# Patient Record
Sex: FEMALE | Race: WHITE | HISPANIC OR LATINO | Employment: OTHER | URBAN - METROPOLITAN AREA
[De-identification: names, ages, dates, MRNs, and addresses within clinical notes are randomized per-mention and may not be internally consistent; named-entity substitution may affect disease eponyms.]

---

## 2017-09-29 ENCOUNTER — OFFICE VISIT (OUTPATIENT)
Dept: INTERNAL MEDICINE | Facility: MEDICAL CENTER | Age: 62
End: 2017-09-29
Payer: MEDICARE

## 2017-09-29 VITALS
HEART RATE: 114 BPM | WEIGHT: 195 LBS | HEIGHT: 63 IN | TEMPERATURE: 97.1 F | OXYGEN SATURATION: 95 % | DIASTOLIC BLOOD PRESSURE: 80 MMHG | BODY MASS INDEX: 34.55 KG/M2 | SYSTOLIC BLOOD PRESSURE: 130 MMHG

## 2017-09-29 DIAGNOSIS — Z23 ENCOUNTER FOR IMMUNIZATION: ICD-10-CM

## 2017-09-29 DIAGNOSIS — M32.8 OTHER FORMS OF SYSTEMIC LUPUS ERYTHEMATOSUS (HCC): ICD-10-CM

## 2017-09-29 DIAGNOSIS — F11.20 NARCOTIC DEPENDENCE (HCC): ICD-10-CM

## 2017-09-29 DIAGNOSIS — Z79.899 LONG-TERM USE OF PLAQUENIL: ICD-10-CM

## 2017-09-29 DIAGNOSIS — Z00.00 HEALTHCARE MAINTENANCE: ICD-10-CM

## 2017-09-29 DIAGNOSIS — M79.7 FIBROMYALGIA: ICD-10-CM

## 2017-09-29 DIAGNOSIS — E11.42 TYPE 2 DIABETES MELLITUS WITH DIABETIC POLYNEUROPATHY, WITH LONG-TERM CURRENT USE OF INSULIN (HCC): ICD-10-CM

## 2017-09-29 DIAGNOSIS — Z79.4 TYPE 2 DIABETES MELLITUS WITH DIABETIC POLYNEUROPATHY, WITH LONG-TERM CURRENT USE OF INSULIN (HCC): ICD-10-CM

## 2017-09-29 PROCEDURE — 99204 OFFICE O/P NEW MOD 45 MIN: CPT | Mod: GC,25 | Performed by: INTERNAL MEDICINE

## 2017-09-29 PROCEDURE — G0008 ADMIN INFLUENZA VIRUS VAC: HCPCS | Performed by: INTERNAL MEDICINE

## 2017-09-29 PROCEDURE — 90686 IIV4 VACC NO PRSV 0.5 ML IM: CPT | Performed by: INTERNAL MEDICINE

## 2017-09-29 RX ORDER — IBUPROFEN 600 MG/1
TABLET ORAL
COMMUNITY
Start: 2017-01-05 | End: 2018-03-23

## 2017-09-29 RX ORDER — ZOLPIDEM TARTRATE 5 MG/1
TABLET ORAL
COMMUNITY
Start: 2017-09-18 | End: 2017-09-29

## 2017-09-29 RX ORDER — UBIDECARENONE 100 MG
CAPSULE ORAL
COMMUNITY
Start: 2017-02-06 | End: 2017-09-29

## 2017-09-29 RX ORDER — LISINOPRIL 10 MG/1
TABLET ORAL
COMMUNITY
Start: 2017-01-23 | End: 2017-10-30 | Stop reason: CLARIF

## 2017-09-29 RX ORDER — BLOOD-GLUCOSE METER
KIT MISCELLANEOUS
COMMUNITY
Start: 2016-10-21 | End: 2018-04-16

## 2017-09-29 RX ORDER — HYDROCODONE BITARTRATE AND ACETAMINOPHEN 10; 325 MG/1; MG/1
TABLET ORAL
COMMUNITY
Start: 2017-09-18 | End: 2017-09-29 | Stop reason: SDUPTHER

## 2017-09-29 RX ORDER — ATORVASTATIN CALCIUM 20 MG/1
TABLET, FILM COATED ORAL
COMMUNITY
Start: 2017-07-24 | End: 2017-10-30 | Stop reason: SDUPTHER

## 2017-09-29 RX ORDER — HYDROCODONE BITARTRATE AND ACETAMINOPHEN 10; 325 MG/1; MG/1
1 TABLET ORAL EVERY 8 HOURS PRN
Qty: 42 TAB | Refills: 0 | Status: SHIPPED | OUTPATIENT
Start: 2017-09-29 | End: 2017-10-13

## 2017-09-29 RX ORDER — ERGOCALCIFEROL 1.25 MG/1
CAPSULE ORAL
COMMUNITY
Start: 2016-04-03 | End: 2017-09-29

## 2017-09-29 RX ORDER — HYDROCODONE BITARTRATE AND ACETAMINOPHEN 10; 325 MG/1; MG/1
TABLET ORAL
Qty: 20 TAB | Status: CANCELLED | OUTPATIENT
Start: 2017-09-29

## 2017-09-29 RX ORDER — SERTRALINE HYDROCHLORIDE 100 MG/1
TABLET, FILM COATED ORAL
COMMUNITY
Start: 2017-04-08 | End: 2017-12-04 | Stop reason: SDUPTHER

## 2017-09-29 RX ORDER — NORTRIPTYLINE HYDROCHLORIDE 25 MG/1
CAPSULE ORAL
COMMUNITY
Start: 2017-04-06 | End: 2017-09-29 | Stop reason: SDUPTHER

## 2017-09-29 RX ORDER — NORTRIPTYLINE HYDROCHLORIDE 25 MG/1
25 CAPSULE ORAL 3 TIMES DAILY
Qty: 30 CAP | Refills: 3 | Status: SHIPPED | OUTPATIENT
Start: 2017-09-29 | End: 2017-10-25 | Stop reason: SDUPTHER

## 2017-09-29 RX ORDER — HYDROXYCHLOROQUINE SULFATE 200 MG/1
TABLET, FILM COATED ORAL
COMMUNITY
Start: 2017-07-24 | End: 2018-07-06 | Stop reason: SDUPTHER

## 2017-09-29 RX ORDER — HYDROXYZINE HYDROCHLORIDE 25 MG/1
TABLET, FILM COATED ORAL
COMMUNITY
Start: 2016-11-19 | End: 2017-09-29

## 2017-09-29 RX ORDER — PILOCARPINE HYDROCHLORIDE 5 MG/1
TABLET, FILM COATED ORAL
COMMUNITY
Start: 2017-01-25 | End: 2018-03-23 | Stop reason: SDUPTHER

## 2017-09-29 RX ORDER — GABAPENTIN 300 MG/1
CAPSULE ORAL
COMMUNITY
Start: 2016-05-28 | End: 2017-12-04 | Stop reason: SDUPTHER

## 2017-09-29 RX ORDER — METRONIDAZOLE 10 MG/G
GEL TOPICAL
COMMUNITY
Start: 2016-10-20 | End: 2018-04-28

## 2017-09-29 ASSESSMENT — PAIN SCALES - GENERAL: PAINLEVEL: 9=SEVERE PAIN

## 2017-09-29 NOTE — PROGRESS NOTES
"Marisa Zayas is a 61 y.o. female here for a non-provider visit for:   FLU    Reason for immunization: Annual Flu Vaccine  Immunization records indicate need for vaccine: Yes, confirmed with Epic  Minimum interval has been met for this vaccine: Yes  ABN completed: Not Indicated    Order and dose verified by: KG  VIS Dated  8/7/15 was given to patient: Yes  All IAC Questionnaire questions were answered \"No.\"    Patient tolerated injection and no adverse effects were observed or reported: Yes    Pt scheduled for next dose in series: Not Indicated    "

## 2017-09-29 NOTE — PATIENT INSTRUCTIONS
QUIT smoking and use nicotine patches   We need medical records from [facility]   Get blood work done and come back in 2 weeks to go over your results   Referral to pain management, optometry (eye doctor), rheumatology

## 2017-09-29 NOTE — LETTER
[facility]  Aidan Castellanos M.D.  [address]  [place] [address]  Fax: [phone]   Authorization for Release/Disclosure of   Protected Health Information   Name: ERMELINDA MANN : 1955 SSN: [id number]   Address: [address]  [place] [address] Phone:    [phone] (home)    I authorize the entity listed below to release/disclose the PHI below to:   [facility]/Aidan Castellanos M.D. and Aidan Castellanos M.D.   Provider or Entity Name:     Address   City, State, Zip   Phone:      Fax:     Reason for request: continuity of care   Information to be released:    [  ] LAST COLONOSCOPY,  including any PATH REPORT and follow-up  [  ] LAST FIT/COLOGUARD RESULT [  ] LAST DEXA  [  ] LAST MAMMOGRAM  [  ] LAST PAP  [  ] LAST LABS [  ] RETINA EXAM REPORT  [  ] IMMUNIZATION RECORDS  [  ] Release all info      [  ] Check here and initial the line next to each item to release ALL health information INCLUDING  _____ Care and treatment for drug and / or alcohol abuse  _____ HIV testing, infection status, or AIDS  _____ Genetic Testing    DATES OF SERVICE OR TIME PERIOD TO BE DISCLOSED: _____________  I understand and acknowledge that:  * This Authorization may be revoked at any time by you in writing, except if your health information has already been used or disclosed.  * Your health information that will be used or disclosed as a result of you signing this authorization could be re-disclosed by the recipient. If this occurs, your re-disclosed health information may no longer be protected by State or Federal laws.  * You may refuse to sign this Authorization. Your refusal will not affect your ability to obtain treatment.  * This Authorization becomes effective upon signing and will  on (date) __________.      If no date is indicated, this Authorization will  one (1) year from the signature date.    Name: Ermelinda Mann    Signature:   Date:     2017       PLEASE FAX REQUESTED RECORDS  BACK TO: [phone]

## 2017-09-30 ASSESSMENT — ENCOUNTER SYMPTOMS
BLOOD IN STOOL: 0
SHORTNESS OF BREATH: 0
SORE THROAT: 0
DIARRHEA: 0
ORTHOPNEA: 0
DIZZINESS: 0
TINGLING: 0
HEMOPTYSIS: 0
WHEEZING: 0
CHILLS: 0
PHOTOPHOBIA: 0
LOSS OF CONSCIOUSNESS: 0
INSOMNIA: 0
PALPITATIONS: 0
FOCAL WEAKNESS: 0
BACK PAIN: 1
MYALGIAS: 1
DIAPHORESIS: 0
SENSORY CHANGE: 0
WEAKNESS: 0
HALLUCINATIONS: 0
HEADACHES: 0
VOMITING: 0
NERVOUS/ANXIOUS: 0
ABDOMINAL PAIN: 0
BLURRED VISION: 0
CONSTIPATION: 0
WEIGHT LOSS: 0
TREMORS: 0
DEPRESSION: 0
SEIZURES: 0
NAUSEA: 0
FEVER: 0
EYE PAIN: 0
HEARTBURN: 0

## 2017-09-30 ASSESSMENT — LIFESTYLE VARIABLES: SUBSTANCE_ABUSE: 0

## 2017-09-30 NOTE — PROGRESS NOTES
New Patient to Establish    Reason to establish: New patient to establish    CC: establish care     HPI:     Marisa Zayas is a friendly 62 yo White woman with PMHx of SLE (on Plaquenil), rheumatoid arthritis, fibromyalgia, type II DM with neuropathy, HTN and tobacco abuse ( 1-2 ppd since she was a teenager) who presents to clinic today to establish care.     On Plaquenil for several years due to lupus. She was diagnosed several years ago (cannot remember exactly when) after a malar rash, multiple episodes of arthralgias and photosensitivity. She had been under the care of a rheumatologist previously. Her joint pain is so severe that she has limited mobility. She is no longer independent and has moved to [place] for this reason. She now has her sister live with her who helps her bath, commute and do other daily activities. She is disabled. Uses cane and walker at home; is mostly wheelchair dependent to commute when outside such as at doctor's office or grocery shopping. Her sister accompanied her today. Uses narcotic prescription drugs for chronic pain due to arthritis; recently, she cut down her Norco  mg to three times daily from QID previously. Also uses nortriptyline, gabapentin and sertraline for pain management along with NSAIDs.     She was hospitalized in 2016 at [place] in [place] due to complicated pneumonia and was intubated in the ICU for several days. Underwent thoracentesis at that time. Does not recall other such episodes of serositis.     Currently still smokes ~1 ppd, although as a teenager she used to smoke 2 packs per day. >30 pack year hx in this current smoker. Denies alcohol or illicit drug abuse.     There are no active problems to display for this patient.      Past Medical History:   Diagnosis Date   • Fibromyalgia 2001   • Lupus 2000   • Depression    • Diabetes    • Hypertension    • Lupus (systemic lupus erythematosus) (CMS-HCC)    • SVT (supraventricular tachycardia) (CMS-[facility])   "      Current Outpatient Prescriptions   Medication Sig Dispense Refill   • pilocarpine (SALAGEN) 5 MG Tab Take 1 tablet by mouth 3 times a day     • sertraline (ZOLOFT) 100 MG Tab Take  by mouth.     • atorvastatin (LIPITOR) 20 MG Tab Take 1 tablet by mouth daily     • lisinopril (PRINIVIL) 10 MG Tab Take  by mouth.     • hydroxychloroquine (PLAQUENIL) 200 MG Tab Take 1 tablet by mouth daily     • ibuprofen (MOTRIN) 600 MG Tab Take  by mouth.     • insulin NPH (HUMULIN N) 100 UNIT/ML Suspension by Subdermal route.     • Insulin Syringe-Needle U-100 (BD INSULIN SYRINGE ULTRAFINE) 31G X 15/64\" 0.3 ML Misc Use for injection as directed     • ONETOUCH DELICA LANCETS FINE Misc by Does not apply route.     • Blood Glucose Monitoring Suppl (ONETOUCH VERIO IQ SYSTEM) w/Device Kit by Does not apply route.     • glucose blood (ONETOUCH VERIO) strip by Does not apply route.     • Cholecalciferol (D 2000) 2000 UNIT Tab Take  by mouth.     • gabapentin (NEURONTIN) 300 MG Cap Take  by mouth.     • metronidazole (METROGEL) 1 % gel Apply to affected area(s) 2 times a day     • Coenzyme Q10 (COQ-10) 100 MG Cap Take  by mouth.     • ipratropium-albuterol (COMBIVENT RESPIMAT)  MCG/ACT Aero Soln Inhale 1 Puff by mouth 4 times a day.     • nortriptyline (PAMELOR) 25 MG Cap Take 1 Cap by mouth 3 times a day. 30 Cap 3   • hydrocodone/acetaminophen (NORCO)  MG Tab Take 1 Tab by mouth every 8 hours as needed for Severe Pain for up to 14 days. 42 Tab 0   • PREDNISONE by Does not apply route.     • GLIPIZIDE by Does not apply route.     • MetFORMIN HCl (GLUCOPHAGE PO) Take  by mouth.     • PARoxetine HCl (PAXIL PO) Take  by mouth.     • LISINOPRIL PO Take  by mouth.     • ATENOLOL PO Take  by mouth.       No current facility-administered medications for this visit.        Allergies as of 09/29/2017   • (No Known Allergies)       Social History     Social History   • Marital status: Single     Spouse name: N/A   • Number of " "children: N/A   • Years of education: N/A     Occupational History   • Not on file.     Social History Main Topics   • Smoking status: Current Every Day Smoker     Packs/day: 1.00   • Smokeless tobacco: Never Used      Comment: 1 ppd since teenager   • Alcohol use No   • Drug use: No   • Sexual activity: Not on file     Other Topics Concern   • Not on file     Social History Narrative   • No narrative on file       Family History   Problem Relation Age of Onset   • Heart Disease Mother 72     Valve repair   • Heart Disease Sister 50     Cardiac arrest, Valve repair   • Genetic Daughter 30     multiple sclerosis       Past Surgical History:   Procedure Laterality Date   • OTHER ORTHOPEDIC SURGERY  2016    back   • APPENDECTOMY     • CHOLECYSTECTOMY     • LAMINOTOMY     • TONSILLECTOMY         ROS: As per HPI. Additional pertinent symptoms as noted below.    Review of Systems   Constitutional: Positive for malaise/fatigue. Negative for chills, diaphoresis, fever and weight loss.   HENT: Negative for sore throat.    Eyes: Negative for blurred vision, photophobia and pain.   Respiratory: Negative for hemoptysis, shortness of breath and wheezing.    Cardiovascular: Negative for chest pain, palpitations, orthopnea and leg swelling.   Gastrointestinal: Negative for abdominal pain, blood in stool, constipation, diarrhea, heartburn, nausea and vomiting.   Musculoskeletal: Positive for back pain, joint pain and myalgias.   Skin: Negative for rash.   Neurological: Negative for dizziness, tingling, tremors, sensory change, focal weakness, seizures, loss of consciousness, weakness and headaches.   Psychiatric/Behavioral: Negative for depression, hallucinations, substance abuse and suicidal ideas. The patient is not nervous/anxious and does not have insomnia.          /80   Pulse (!) 114   Temp 36.2 °C (97.1 °F)   Ht 1.6 m (5' 3\")   Wt 88.5 kg (195 lb)   SpO2 95%   BMI 34.54 kg/m²     Physical Exam  General:  Middle " aged woman appearing older than stated age, with central obesity and round facies, in NAD, conversant and pleasant    Eyes: Pupils equal and reactive. EOMI. No scleral icterus. No nystagmus     Throat: Clear no erythema or exudates noted. No thrush.       Neck: Supple. No lymphadenopathy noted. Thyroid not enlarged.    Lungs: Clear to auscultation bilaterally. No tachypnea or use of accessory mm of respiration.     Cardiovascular: Regular rate and rhythm. Normal S1, S2. No murmurs, rubs or gallops. No heaves or lifts noted.     Abdomen:  Benign. No rebound or guarding noted. Normoactive bowel sounds. No hepatosplenomegaly. Obese pannus with striae.     Extremities: No clubbing, cyanosis, edema.    Diabetic foot exam:   Physical Exam   Cardiovascular:   Pulses:       Dorsalis pedis pulses are 2+ on the right side, and 2+ on the left side.   Musculoskeletal:        Right foot: There is normal range of motion and no deformity.        Left foot: There is normal range of motion and no deformity.   Feet:   Right Foot:   Protective Sensation: 10 sites tested. 10 sites sensed.   Skin Integrity: Positive for callus and dry skin. Negative for ulcer, blister, skin breakdown, erythema or warmth.   Left Foot:   Protective Sensation: 10 sites tested. 10 sites sensed.   Skin Integrity: Positive for dry skin. Negative for ulcer, blister, skin breakdown, erythema, warmth or callus.     Skin: Clear. No rash or suspicious skin lesions noted.    Neuro: A&O x 4. CN II-XII intact. No resting or intention tremor. No Rhomberg sign. Gait abnormal with impaired right lower extremity abduction with ambulation.     Note: I have reviewed all pertinent labs and diagnostic tests associated with this visit with specific comments listed under the assessment and plan below    Assessment and Plan    1. Other forms of systemic lupus erythematosus (CMS-HCC)  - obtain prior records     - REFERRAL TO RHEUMATOLOGY  - REFERRAL TO OPTOMETRY  - HEMOGLOBIN  A1C; Future  - CBC WITH DIFFERENTIAL; Future  - COMP METABOLIC PANEL; Future  - LIPID PROFILE; Future  - MICROALBUMIN CREAT RATIO URINE; Future  - REFERRAL TO PAIN CLINIC  - nortriptyline (PAMELOR) 25 MG Cap; Take 1 Cap by mouth 3 times a day.  Dispense: 30 Cap; Refill: 3  - hydrocodone/acetaminophen (NORCO)  MG Tab; Take 1 Tab by mouth every 8 hours as needed for Severe Pain for up to 14 days.  Dispense: 42 Tab; Refill: 0    2. Long-term use of Plaquenil  3. Chronic immunosuppression   - urgent referral sent to rheumatology   - will need regular eye exams due to Plaquenil use     - REFERRAL TO OPTOMETRY  - HEMOGLOBIN A1C; Future  - CBC WITH DIFFERENTIAL; Future  - COMP METABOLIC PANEL; Future  - LIPID PROFILE; Future  - MICROALBUMIN CREAT RATIO URINE; Future  - REFERRAL TO PAIN CLINIC    4. Narcotic dependence (CMS-HCC)  - signed pain contract today and gave her a prescription refill for Norco  mg q8H prn severe pain for 14 days   - pt to come back in 14 days for check up and lab review; meanwhile, she will be seen by our internal pain management (either Tyler Yadav or Mar[facility]) until she can formally be seen by pain clinic   - urine drug testing today   - REFERRAL TO PAIN CLINIC  - Controlled Substance Treatment Agreement  - [facility] PAIN MANAGEMENT SCREEN; Future  - hydrocodone/acetaminophen (NORCO)  MG Tab; Take 1 Tab by mouth every 8 hours as needed for Severe Pain for up to 14 days.  Dispense: 42 Tab; Refill: 0    4. Healthcare maintenance    Preventive care  Flu - today 9/29/17   TD- utd reportedly, need prior records   TDaP - utd -- need prior records   Pneumococcal - reportedly given in [place] when pt was hospitalized, will obtain records   Prevnar - f/u prior records   Colonoscopy - done in [place] in 2016   Zostavax- counseled, pt can obtain at any out-pt pharmacy   HCV - needs to be tested    Women only  Pap - obtain records to see when it was last done; if not, will obtain at  next visit   Mammogram - done in 2016, will obtain records   Dexa - done 3 years ago     - HEMOGLOBIN A1C; Future  - CBC WITH DIFFERENTIAL; Future  - COMP METABOLIC PANEL; Future  - LIPID PROFILE; Future  - MICROALBUMIN CREAT RATIO URINE; Future  - REFERRAL TO PAIN CLINIC  - HEP C VIRUS ANTIBODY    5. Type 2 diabetes mellitus with diabetic polyneuropathy, with long-term current use of insulin (CMS-[facility])  - fasting labs   - continue current insulin regimen and f/u in 2 weeks   - keep log of daily blood sugars as well as diet   - HEMOGLOBIN A1C; Future  - COMP METABOLIC PANEL; Future  - MICROALBUMIN CREAT RATIO URINE; Future  - nortriptyline (PAMELOR) 25 MG Cap; Take 1 Cap by mouth 3 times a day.  Dispense: 30 Cap; Refill: 3    6. Fibromyalgia  - nortriptyline (PAMELOR) 25 MG Cap; Take 1 Cap by mouth 3 times a day.  Dispense: 30 Cap; Refill: 3  - hydrocodone/acetaminophen (NORCO)  MG Tab; Take 1 Tab by mouth every 8 hours as needed for Severe Pain for up to 14 days.  Dispense: 42 Tab; Refill: 0    7. Encounter for immunization  - Consent for Influenza Vaccine  - Flu Quad Inj >3 Year Pre-Filled PF      8. Tobacco abuse   - discussed at length need for smoking cessation; smoking causes inflammation which will worsen her Lupus and arthritis flares   - pt to use nicotine patches that she had obtained before discharge from her last hospitalization   - will check again at next visit   - will need referral for low dose CT scan for lung cancer screening at f/u visit in 5 weeks       9. Long term use of NSAIDs   - DMII and SLE can lead to nephropathy; discussed that she MUST limit her NSAID usage to prevent renal impairment, pt voiced understanding -- will discuss again at f/u visit       Followup: Return in about 2 weeks (around 10/13/2017) for Long.    Pt to f/u in 2 weeks with labs. Then, she must f/u again within 3 weeks (to be seen specifically by me in early November)    Risk Assessment (discuss potential  complications a function of chronic problems):     chronically immunosuppressed due to Plaquenil use along with DMII; retinopathy is a complication of chronic Plaquenil usage;   DMII and SLE can lead to nephropathy; discussed that she MUST limit her NSAID usage to prevent renal impairment, pt voiced understanding -- will discuss again at f/u visit   narcotic dependence; tobacco abuse     Complexity (discuss number of co-morbidities): multiple comorbid conditions including     Signed by: Aidan Castellanos M.D.

## 2017-10-13 ENCOUNTER — APPOINTMENT (OUTPATIENT)
Dept: INTERNAL MEDICINE | Facility: MEDICAL CENTER | Age: 62
End: 2017-10-13
Payer: MEDICARE

## 2017-10-14 ENCOUNTER — HOSPITAL ENCOUNTER (OUTPATIENT)
Dept: LAB | Facility: MEDICAL CENTER | Age: 62
End: 2017-10-14
Attending: HOSPITALIST
Payer: MEDICARE

## 2017-10-14 DIAGNOSIS — Z79.899 LONG-TERM USE OF PLAQUENIL: ICD-10-CM

## 2017-10-14 DIAGNOSIS — M32.8 OTHER FORMS OF SYSTEMIC LUPUS ERYTHEMATOSUS (HCC): ICD-10-CM

## 2017-10-14 DIAGNOSIS — Z00.00 HEALTHCARE MAINTENANCE: ICD-10-CM

## 2017-10-14 DIAGNOSIS — Z79.4 TYPE 2 DIABETES MELLITUS WITH DIABETIC POLYNEUROPATHY, WITH LONG-TERM CURRENT USE OF INSULIN (HCC): ICD-10-CM

## 2017-10-14 DIAGNOSIS — E11.42 TYPE 2 DIABETES MELLITUS WITH DIABETIC POLYNEUROPATHY, WITH LONG-TERM CURRENT USE OF INSULIN (HCC): ICD-10-CM

## 2017-10-14 LAB
ALBUMIN SERPL BCP-MCNC: 4.8 G/DL (ref 3.2–4.9)
ALBUMIN/GLOB SERPL: 1.8 G/DL
ALP SERPL-CCNC: 93 U/L (ref 30–99)
ALT SERPL-CCNC: 53 U/L (ref 2–50)
ANION GAP SERPL CALC-SCNC: 9 MMOL/L (ref 0–11.9)
AST SERPL-CCNC: 45 U/L (ref 12–45)
BASOPHILS # BLD AUTO: 1.1 % (ref 0–1.8)
BASOPHILS # BLD: 0.08 K/UL (ref 0–0.12)
BILIRUB SERPL-MCNC: 0.4 MG/DL (ref 0.1–1.5)
BUN SERPL-MCNC: 16 MG/DL (ref 8–22)
CALCIUM SERPL-MCNC: 9.6 MG/DL (ref 8.5–10.5)
CHLORIDE SERPL-SCNC: 103 MMOL/L (ref 96–112)
CHOLEST SERPL-MCNC: 251 MG/DL (ref 100–199)
CO2 SERPL-SCNC: 24 MMOL/L (ref 20–33)
CREAT SERPL-MCNC: 0.65 MG/DL (ref 0.5–1.4)
CREAT UR-MCNC: 200.3 MG/DL
EOSINOPHIL # BLD AUTO: 0.24 K/UL (ref 0–0.51)
EOSINOPHIL NFR BLD: 3.2 % (ref 0–6.9)
ERYTHROCYTE [DISTWIDTH] IN BLOOD BY AUTOMATED COUNT: 43.8 FL (ref 35.9–50)
EST. AVERAGE GLUCOSE BLD GHB EST-MCNC: 189 MG/DL
GFR SERPL CREATININE-BSD FRML MDRD: >60 ML/MIN/1.73 M 2
GLOBULIN SER CALC-MCNC: 2.7 G/DL (ref 1.9–3.5)
GLUCOSE SERPL-MCNC: 154 MG/DL (ref 65–99)
HBA1C MFR BLD: 8.2 % (ref 0–5.6)
HCT VFR BLD AUTO: 45.2 % (ref 37–47)
HDLC SERPL-MCNC: 63 MG/DL
HGB BLD-MCNC: 15.7 G/DL (ref 12–16)
IMM GRANULOCYTES # BLD AUTO: 0.03 K/UL (ref 0–0.11)
IMM GRANULOCYTES NFR BLD AUTO: 0.4 % (ref 0–0.9)
LDLC SERPL CALC-MCNC: 149 MG/DL
LYMPHOCYTES # BLD AUTO: 2.3 K/UL (ref 1–4.8)
LYMPHOCYTES NFR BLD: 30.7 % (ref 22–41)
MCH RBC QN AUTO: 33.8 PG (ref 27–33)
MCHC RBC AUTO-ENTMCNC: 34.7 G/DL (ref 33.6–35)
MCV RBC AUTO: 97.4 FL (ref 81.4–97.8)
MICROALBUMIN UR-MCNC: 5.6 MG/DL
MICROALBUMIN/CREAT UR: 28 MG/G (ref 0–30)
MONOCYTES # BLD AUTO: 0.32 K/UL (ref 0–0.85)
MONOCYTES NFR BLD AUTO: 4.3 % (ref 0–13.4)
NEUTROPHILS # BLD AUTO: 4.52 K/UL (ref 2–7.15)
NEUTROPHILS NFR BLD: 60.3 % (ref 44–72)
NRBC # BLD AUTO: 0 K/UL
NRBC BLD AUTO-RTO: 0 /100 WBC
PLATELET # BLD AUTO: 115 K/UL (ref 164–446)
PMV BLD AUTO: 11.6 FL (ref 9–12.9)
POTASSIUM SERPL-SCNC: 4.2 MMOL/L (ref 3.6–5.5)
PROT SERPL-MCNC: 7.5 G/DL (ref 6–8.2)
RBC # BLD AUTO: 4.64 M/UL (ref 4.2–5.4)
SODIUM SERPL-SCNC: 136 MMOL/L (ref 135–145)
TRIGL SERPL-MCNC: 195 MG/DL (ref 0–149)
WBC # BLD AUTO: 7.5 K/UL (ref 4.8–10.8)

## 2017-10-14 PROCEDURE — 83036 HEMOGLOBIN GLYCOSYLATED A1C: CPT

## 2017-10-14 PROCEDURE — 85025 COMPLETE CBC W/AUTO DIFF WBC: CPT

## 2017-10-14 PROCEDURE — 80053 COMPREHEN METABOLIC PANEL: CPT

## 2017-10-14 PROCEDURE — 82043 UR ALBUMIN QUANTITATIVE: CPT

## 2017-10-14 PROCEDURE — 36415 COLL VENOUS BLD VENIPUNCTURE: CPT

## 2017-10-14 PROCEDURE — 80061 LIPID PANEL: CPT

## 2017-10-14 PROCEDURE — 82570 ASSAY OF URINE CREATININE: CPT

## 2017-10-25 DIAGNOSIS — Z79.4 TYPE 2 DIABETES MELLITUS WITH DIABETIC POLYNEUROPATHY, WITH LONG-TERM CURRENT USE OF INSULIN (HCC): ICD-10-CM

## 2017-10-25 DIAGNOSIS — E11.42 TYPE 2 DIABETES MELLITUS WITH DIABETIC POLYNEUROPATHY, WITH LONG-TERM CURRENT USE OF INSULIN (HCC): ICD-10-CM

## 2017-10-25 DIAGNOSIS — M79.7 FIBROMYALGIA: ICD-10-CM

## 2017-10-26 RX ORDER — NORTRIPTYLINE HYDROCHLORIDE 25 MG/1
25 CAPSULE ORAL 3 TIMES DAILY
Qty: 30 CAP | Refills: 2 | Status: SHIPPED | OUTPATIENT
Start: 2017-10-26 | End: 2019-10-24

## 2017-10-26 NOTE — TELEPHONE ENCOUNTER
Patient Seen: 09/29/17 Next Appointment: 10/30/17  Was the patient seen in the last year in this department? Yes     Does patient have an active prescription for medications requested? No     Received Request Via: Patient

## 2017-10-30 ENCOUNTER — OFFICE VISIT (OUTPATIENT)
Dept: INTERNAL MEDICINE | Facility: MEDICAL CENTER | Age: 62
End: 2017-10-30
Payer: MEDICARE

## 2017-10-30 ENCOUNTER — TELEPHONE (OUTPATIENT)
Dept: INTERNAL MEDICINE | Facility: MEDICAL CENTER | Age: 62
End: 2017-10-30

## 2017-10-30 VITALS
TEMPERATURE: 97.7 F | BODY MASS INDEX: 35.26 KG/M2 | RESPIRATION RATE: 16 BRPM | HEART RATE: 81 BPM | SYSTOLIC BLOOD PRESSURE: 126 MMHG | HEIGHT: 63 IN | DIASTOLIC BLOOD PRESSURE: 66 MMHG | OXYGEN SATURATION: 93 % | WEIGHT: 199 LBS

## 2017-10-30 DIAGNOSIS — G89.28 OTHER CHRONIC POSTPROCEDURAL PAIN: ICD-10-CM

## 2017-10-30 DIAGNOSIS — E11.42 TYPE 2 DIABETES MELLITUS WITH DIABETIC POLYNEUROPATHY, WITH LONG-TERM CURRENT USE OF INSULIN (HCC): ICD-10-CM

## 2017-10-30 DIAGNOSIS — Z79.4 TYPE 2 DIABETES MELLITUS WITH DIABETIC POLYNEUROPATHY, WITH LONG-TERM CURRENT USE OF INSULIN (HCC): ICD-10-CM

## 2017-10-30 DIAGNOSIS — I10 ESSENTIAL HYPERTENSION: ICD-10-CM

## 2017-10-30 DIAGNOSIS — M35.9 UNDIFFERENTIATED CONNECTIVE TISSUE DISEASE (HCC): ICD-10-CM

## 2017-10-30 DIAGNOSIS — E66.9 OBESITY (BMI 30-39.9): ICD-10-CM

## 2017-10-30 PROCEDURE — 99214 OFFICE O/P EST MOD 30 MIN: CPT | Mod: GC | Performed by: INTERNAL MEDICINE

## 2017-10-30 RX ORDER — LISINOPRIL 10 MG/1
10 TABLET ORAL DAILY
Qty: 30 TAB | Refills: 3 | Status: SHIPPED | OUTPATIENT
Start: 2017-10-30 | End: 2018-07-06 | Stop reason: SDUPTHER

## 2017-10-30 RX ORDER — ATORVASTATIN CALCIUM 20 MG/1
40 TABLET, FILM COATED ORAL DAILY
Qty: 30 TAB | Refills: 3 | Status: SHIPPED | OUTPATIENT
Start: 2017-10-30 | End: 2018-07-06

## 2017-10-30 RX ORDER — HYDROCODONE BITARTRATE AND ACETAMINOPHEN 10; 325 MG/1; MG/1
1-2 TABLET ORAL EVERY 8 HOURS PRN
Qty: 63 TAB | Refills: 0 | Status: SHIPPED | OUTPATIENT
Start: 2017-10-30 | End: 2017-11-20

## 2017-10-30 RX ORDER — METFORMIN HYDROCHLORIDE 500 MG/1
1000 TABLET, EXTENDED RELEASE ORAL DAILY
Qty: 60 TAB | Refills: 3 | Status: SHIPPED | OUTPATIENT
Start: 2017-10-30 | End: 2017-12-04 | Stop reason: SDUPTHER

## 2017-10-30 ASSESSMENT — PATIENT HEALTH QUESTIONNAIRE - PHQ9: CLINICAL INTERPRETATION OF PHQ2 SCORE: 0

## 2017-10-30 ASSESSMENT — PAIN SCALES - GENERAL
PAINLEVEL: 10=SEVERE PAIN
PAINLEVEL: 9=SEVERE PAIN

## 2017-10-30 NOTE — PATIENT INSTRUCTIONS
Start taking metformin (1 pill daily for the first week, then take 2 daily next week onwards). Better to take with food.   Call Rheumatology and Eye doctor ASAP.   Cut DOWN ibuprofen to 600 mg up to maximum three times daily.   Start taking Norco  three times daily until seen by pain clinic.   Take atorvastatin 40 mg daily (instead of 20 mg).   You are up to date on vaccinations.   Next time, we will obtain pap smear when you are not in pain.

## 2017-10-30 NOTE — TELEPHONE ENCOUNTER
1. Caller Name: Marisa Zayas                                         Call Back Number: [phone] (home)     Called patient to let her know that her Rx NORCO is ready for  at the front.

## 2017-10-31 NOTE — PROGRESS NOTES
Established Patient    Marisa presents today with the following:    CC: back pain and knee pain, 5 week f/u     HPI:   Marisa Zayas is a friendly 60 yo White woman with PMHx of undifferentiated connective tissue disease (on Plaquenil),, fibromyalgia, type II DM with neuropathy, HTN and tobacco abuse ( 1-2 ppd since she was a teenager) who presents to clinic today for 5 week f/u. She missed her last f/u at 3 weeks after initial visit.     Current back and bilateral knee pain is severe 9/10. She has not been taking any narcotic pain medications in the last one week. She ran out of her 2 week supply of narcotics. This was given to her until she can establish care with [facility] pain clinic, but her appointment with pain is in mid November. Her UDS was reviewed and was consistent with narcotic treatment. She has been taking 6 pills daily of 600 mg ibuprofen (around the clock) to alleviate her joint pain. Denies any black or bloody stools or abdominal pain. She understands that such high dose NSAID usage can result in kidney damage but she is in severe pain after being out of her narcotics. No aberrant behavior or substance use.     Undifferentiated connective tissue disease:   Reported that she was dx with SLE but records from Rheumatology obtained and showed this diagnosis. On plaquenil. Joint pain worse after stopping narcotics. Still taking gabapentin and nortriptyline. Avoiding sun exposure which causes photosensitive rash. Has not made an appointment with Rheum or optometry due to insurance issues previously, but will do so now.     Healthcare maintenance:   Reports she was tested for HCV by her prior PCP and was negative. Needs mammogram referral in 2017-18 and pap smear at next visit. She is in pain today and won't be able to be up in [facility].     Tobacco abuse:   Continues to smoke although trying to cut down.  Understands inflammation from smoking can worsen connective tissue disease.       Patient Active  "Problem List    Diagnosis Date Noted   • Obesity (BMI 30-39.9) 10/30/2017   • Undifferentiated connective tissue disease (CMS-HCC) 10/30/2017   • Essential hypertension 10/30/2017   • Type 2 diabetes mellitus with diabetic polyneuropathy, with long-term current use of insulin (CMS-HCC) 10/30/2017       Current Outpatient Prescriptions   Medication Sig Dispense Refill   • metformin ER (GLUCOPHAGE XR) 500 MG TABLET SR 24 HR Take 2 Tabs by mouth every day. 60 Tab 3   • glucose monitoring kit (FREESTYLE) monitoring kit 1 Each by Does not apply route Once for 1 dose. 1 Kit 0   • lisinopril (PRINIVIL) 10 MG Tab Take 1 Tab by mouth every day. 30 Tab 3   • atorvastatin (LIPITOR) 20 MG Tab Take 2 Tabs by mouth every day. TAKE 2 TABLETS BY MOUTH DAILY 30 Tab 3   • hydrocodone/acetaminophen (NORCO)  MG Tab Take 1-2 Tabs by mouth every 8 hours as needed for Severe Pain for up to 21 days. 63 Tab 0   • nortriptyline (PAMELOR) 25 MG Cap Take 1 Cap by mouth 3 times a day. 30 Cap 2   • pilocarpine (SALAGEN) 5 MG Tab Take 1 tablet by mouth 3 times a day     • sertraline (ZOLOFT) 100 MG Tab Take  by mouth.     • hydroxychloroquine (PLAQUENIL) 200 MG Tab Take 1 tablet by mouth daily     • ibuprofen (MOTRIN) 600 MG Tab Take  by mouth.     • insulin NPH (HUMULIN N) 100 UNIT/ML Suspension by Subdermal route.     • Insulin Syringe-Needle U-100 (BD INSULIN SYRINGE ULTRAFINE) 31G X 15/64\" 0.3 ML Misc Use for injection as directed     • ONETOUCH DELICA LANCETS FINE Misc by Does not apply route.     • Blood Glucose Monitoring Suppl (ONETOUCH VERIO IQ SYSTEM) w/Device Kit by Does not apply route.     • glucose blood (ONETOUCH VERIO) strip by Does not apply route.     • Cholecalciferol (D 2000) 2000 UNIT Tab Take  by mouth.     • gabapentin (NEURONTIN) 300 MG Cap Take  by mouth.     • metronidazole (METROGEL) 1 % gel Apply to affected area(s) 2 times a day     • Coenzyme Q10 (COQ-10) 100 MG Cap Take  by mouth.     • ipratropium-albuterol " "(COMBIVENT RESPIMAT)  MCG/ACT Aero Soln Inhale 1 Puff by mouth 4 times a day.     • PREDNISONE by Does not apply route.     • GLIPIZIDE by Does not apply route.     • PARoxetine HCl (PAXIL PO) Take  by mouth.     • ATENOLOL PO Take  by mouth.       No current facility-administered medications for this visit.        ROS: As per HPI. Additional pertinent symptoms as noted below.    Review of Systems   Constitutional: Negative for chills, diaphoresis, fever and weight loss.   HENT: Negative for sore throat.    Eyes: Negative for blurred vision, photophobia and pain.   Respiratory: Negative for hemoptysis, shortness of breath and wheezing.    Cardiovascular: Negative for chest pain, palpitations, orthopnea and leg swelling.   Gastrointestinal: Negative for abdominal pain, blood in stool, constipation, diarrhea, heartburn, nausea and vomiting.   Musculoskeletal: Positive for back pain (severe today since she did not take pain meds), joint pain and myalgias.   Skin: Negative for rash.   Neurological: Negative for dizziness, tingling, tremors, sensory change, focal weakness, seizures, loss of consciousness, weakness and headaches.   Psychiatric/Behavioral: Negative for depression, hallucinations, substance abuse and suicidal ideas. The patient is not nervous/anxious and does not have insomnia.      /66   Pulse 81   Temp 36.5 °C (97.7 °F)   Resp 16   Ht 1.6 m (5' 3\")   Wt 90.3 kg (199 lb)   SpO2 93%   BMI 35.25 kg/m²     Physical Exam    General:  Middle aged woman appearing older than stated age, with central obesity and round facies, in some distress    Eyes: Pupils equal and reactive. EOMI. No scleral icterus. No nystagmus     Throat: Clear no erythema or exudates noted. No thrush.        Neck: Supple. No lymphadenopathy noted. Thyroid not enlarged.     Lungs: Clear to auscultation bilaterally. No tachypnea or use of accessory mm of respiration.      Cardiovascular: Regular rate and rhythm. Normal S1, " S2. No murmurs, rubs or gallops. No heaves or lifts noted.      Abdomen:  Benign. No rebound or guarding noted. Normoactive bowel sounds. No hepatosplenomegaly. Obese pannus      Extremities: No clubbing, cyanosis, edema.    Skin: Clear. No rash or suspicious skin lesions noted.     Neuro: A&O x 4. CN II-XII intact. No resting or intention tremor.    Note: I have reviewed all pertinent labs and diagnostic tests associated with this visit with specific comments listed under the assessment and plan below      Assessment and Plan    Start metformin  mg   F/u with Rheum, optometry and [facility] pain clinic   Pap - next visit (pt in pain today, not able to get up in [facility])   Mammogram - will need referral at next visit   Referral for low dose CT scan for lung cancer screening   Repeat blood work in 1-2 mo for HbA1C      1. Type 2 diabetes mellitus with diabetic polyneuropathy, with long-term current use of insulin (CMS-HCC)  - metformin ER (GLUCOPHAGE XR) 500 MG TABLET SR 24 HR; Take 2 Tabs by mouth every day.  Dispense: 60 Tab; Refill: 3   - repeat HbA1C at next visit and discuss stopping insulin; she was on SSI due to her preference expressed to prior PCP; she did not want to take more oral medications including metformin and glipizide, but is willing to try metformin again now     - glucose monitoring kit (FREESTYLE) monitoring kit; 1 Each by Does not apply route Once for 1 dose.  Dispense: 1 Kit; Refill: 0  - lisinopril (PRINIVIL) 10 MG Tab; Take 1 Tab by mouth every day.  Dispense: 30 Tab; Refill: 3  - atorvastatin (LIPITOR) 20 MG Tab; Take 2 Tabs by mouth every day. TAKE 2 TABLETS BY MOUTH DAILY  Dispense: 30 Tab; Refill: 3  - REFERRAL TO NUTRITION SERVICES    2. Essential hypertension  - lisinopril (PRINIVIL) 10 MG Tab; Take 1 Tab by mouth every day.  Dispense: 30 Tab; Refill: 3    3. Fibromyalgia  - continue gabapentin, nortriptyline   - [place] until seen by [facility] pain (appointment in mid Nov)    -  hydrocodone/acetaminophen (NORCO)  MG Tab; Take 1-2 Tabs by mouth every 8 hours as needed for Severe Pain for up to 21 days.  Dispense: 63 Tab; Refill: 0      4. Undifferentiated connective tissue disease (CMS-HCC)  5. Long-term use of Plaquenil  6. Chronic immunosuppression   - referral to rheumatology already sent; pt now with insurance so she will make appointment   - will need regular eye exams due to Plaquenil use -- referral sent to optometry, pt to make appointment   - hydrocodone/acetaminophen (NORCO)  MG Tab; Take 1-2 Tabs by mouth every 8 hours as needed for Severe Pain for up to 21 days.  Dispense: 63 Tab; Refill: 0   - she has an appointment with [facility] pain in mid November; pain contract already signed, UDS reviewed       7. Obesity (BMI 30-39.9)  - metformin ER (GLUCOPHAGE XR) 500 MG TABLET SR 24 HR; Take 2 Tabs by mouth every day.  Dispense: 60 Tab; Refill: 3      8. Tobacco abuse   - discussed need for smoking cessation; smoking causes inflammation which will worsen her Lupus and arthritis flares   - pt to use nicotine patches that she had obtained before discharge from her last hospitalization   - will need referral for low dose CT scan for lung cancer screening at f/u visit       9. Long term use of NSAIDs   - pt agreed to cut down ibuprofen 600 mg q4h to ibuprofen 600 mg q8h   discussed that she MUST limit her NSAID usage to prevent renal impairment, pt voiced understanding       Preventive care  Flu - 9/2017  TD- 2011  TDaP - 2011  PPSV23 given in 2011 at   Colonoscopy - utd, declines repeat colonoscopy   Zostavax- 2016  HCV - reports she was tested by her prior PCP, negative     Women only  Pap - next visit (pt in pain today, not able to get up in [facility])   Mammogram - will need referral at next visit   Dexa - n/a       Followup: Return in about 5 weeks (around 12/4/2017) for Short.      Signed by: Aidan Castellanos M.D.

## 2017-11-30 ENCOUNTER — HOSPITAL ENCOUNTER (OUTPATIENT)
Dept: RADIOLOGY | Facility: MEDICAL CENTER | Age: 62
End: 2017-11-30
Attending: PAIN MEDICINE
Payer: MEDICARE

## 2017-11-30 DIAGNOSIS — M47.814 THORACIC SPONDYLOSIS WITHOUT MYELOPATHY: ICD-10-CM

## 2017-11-30 PROCEDURE — 72157 MRI CHEST SPINE W/O & W/DYE: CPT

## 2017-11-30 PROCEDURE — 72158 MRI LUMBAR SPINE W/O & W/DYE: CPT

## 2017-11-30 PROCEDURE — 700117 HCHG RX CONTRAST REV CODE 255: Performed by: PAIN MEDICINE

## 2017-11-30 PROCEDURE — A9579 GAD-BASE MR CONTRAST NOS,1ML: HCPCS | Performed by: PAIN MEDICINE

## 2017-11-30 RX ADMIN — GADODIAMIDE 20 ML: 287 INJECTION INTRAVENOUS at 15:50

## 2017-12-04 ENCOUNTER — OFFICE VISIT (OUTPATIENT)
Dept: INTERNAL MEDICINE | Facility: MEDICAL CENTER | Age: 62
End: 2017-12-04
Payer: MEDICARE

## 2017-12-04 DIAGNOSIS — M79.7 FIBROMYALGIA: ICD-10-CM

## 2017-12-04 DIAGNOSIS — E11.42 TYPE 2 DIABETES MELLITUS WITH DIABETIC POLYNEUROPATHY, WITH LONG-TERM CURRENT USE OF INSULIN (HCC): ICD-10-CM

## 2017-12-04 DIAGNOSIS — F33.41 RECURRENT MAJOR DEPRESSIVE DISORDER, IN PARTIAL REMISSION (HCC): ICD-10-CM

## 2017-12-04 DIAGNOSIS — Z12.39 SCREENING FOR BREAST CANCER: ICD-10-CM

## 2017-12-04 DIAGNOSIS — Z00.00 HEALTHCARE MAINTENANCE: ICD-10-CM

## 2017-12-04 DIAGNOSIS — Z79.4 TYPE 2 DIABETES MELLITUS WITH DIABETIC POLYNEUROPATHY, WITH LONG-TERM CURRENT USE OF INSULIN (HCC): ICD-10-CM

## 2017-12-04 PROCEDURE — 99214 OFFICE O/P EST MOD 30 MIN: CPT | Mod: GC | Performed by: INTERNAL MEDICINE

## 2017-12-04 RX ORDER — SERTRALINE HYDROCHLORIDE 100 MG/1
100 TABLET, FILM COATED ORAL DAILY
Qty: 30 TAB | Refills: 6 | Status: SHIPPED | OUTPATIENT
Start: 2017-12-04 | End: 2018-07-06 | Stop reason: SDUPTHER

## 2017-12-04 RX ORDER — METFORMIN HYDROCHLORIDE 500 MG/1
1000 TABLET, EXTENDED RELEASE ORAL DAILY
Qty: 60 TAB | Refills: 3 | Status: SHIPPED | OUTPATIENT
Start: 2017-12-04 | End: 2018-07-06 | Stop reason: SDUPTHER

## 2017-12-04 RX ORDER — GABAPENTIN 300 MG/1
300 CAPSULE ORAL 3 TIMES DAILY
Qty: 90 CAP | Refills: 6 | Status: SHIPPED | OUTPATIENT
Start: 2017-12-04 | End: 2018-04-28

## 2017-12-04 NOTE — PATIENT INSTRUCTIONS
Quit SMOKING   F/u with blood work in mid January   Referral for mammogram. Please see your eye doctor as soon as possible.   Please call [place] to send your rheumatology records to rheumatology clinic.   Call me with your glucometer brand name and insulin. I will refill it directly.

## 2017-12-05 NOTE — PROGRESS NOTES
"      Established Patient    Marisa presents today with the following:    CC: 5 week f/u     HPI:     Marisa Zayas is a friendly 60 yo White woman with PMHx of undifferentiated connective tissue disease (on Plaquenil),, fibromyalgia, type II DM with neuropathy, HTN and extensive tobacco abuse who presents to clinic today for 5 week f/u. She has not yet seen the rheumatology referral or had an eye exam done since her last visit.     Undifferentiated connective tissue disease:   On plaquenil. Joint pain worsening now. Was seen at pain clinic here who prescribed her narcotics. Still taking gabapentin and nortriptyline. Avoiding sun exposure which causes photosensitive rash. Has not made an appointment with Rheum or optometry due to insurance issues previously; referral sent to rheum but has had trouble seeing them. Discussed decreasing ibuprofen.      Healthcare maintenance:   Reports she was tested for HCV by her prior PCP and was negative. Needs mammogram referral in 2017-18 and pap smear at next visit. Declines pap smear again today since she is having \"the runs\" (GI upset).      Tobacco abuse:   Continues to smoke but cut it down since she can't afford it.  Understands inflammation from smoking can worsen connective tissue disease.     Patient Active Problem List    Diagnosis Date Noted   • Obesity (BMI 30-39.9) 10/30/2017   • Undifferentiated connective tissue disease (CMS-[facility]) 10/30/2017   • Essential hypertension 10/30/2017   • Type 2 diabetes mellitus with diabetic polyneuropathy, with long-term current use of insulin (CMS-HCC) 10/30/2017       Current Outpatient Prescriptions   Medication Sig Dispense Refill   • gabapentin (NEURONTIN) 300 MG Cap Take 1 Cap by mouth 3 times a day. 90 Cap 6   • sertraline (ZOLOFT) 100 MG Tab Take 1 Tab by mouth every day. 30 Tab 6   • metformin ER (GLUCOPHAGE XR) 500 MG TABLET SR 24 HR Take 2 Tabs by mouth every day. 60 Tab 3   • lisinopril (PRINIVIL) 10 MG Tab Take 1 Tab by " "mouth every day. 30 Tab 3   • atorvastatin (LIPITOR) 20 MG Tab Take 2 Tabs by mouth every day. TAKE 2 TABLETS BY MOUTH DAILY 30 Tab 3   • nortriptyline (PAMELOR) 25 MG Cap Take 1 Cap by mouth 3 times a day. 30 Cap 2   • pilocarpine (SALAGEN) 5 MG Tab Take 1 tablet by mouth 3 times a day     • hydroxychloroquine (PLAQUENIL) 200 MG Tab Take 1 tablet by mouth daily     • ibuprofen (MOTRIN) 600 MG Tab Take  by mouth.     • insulin NPH (HUMULIN N) 100 UNIT/ML Suspension by Subdermal route.     • Insulin Syringe-Needle U-100 (BD INSULIN SYRINGE ULTRAFINE) 31G X 15/64\" 0.3 ML Misc Use for injection as directed     • ONETOUCH DELICA LANCETS FINE Misc by Does not apply route.     • Blood Glucose Monitoring Suppl (ONETOUCH VERIO IQ SYSTEM) w/Device Kit by Does not apply route.     • glucose blood (ONETOUCH VERIO) strip by Does not apply route.     • Cholecalciferol (D 2000) 2000 UNIT Tab Take  by mouth.     • metronidazole (METROGEL) 1 % gel Apply to affected area(s) 2 times a day     • Coenzyme Q10 (COQ-10) 100 MG Cap Take  by mouth.     • ipratropium-albuterol (COMBIVENT RESPIMAT)  MCG/ACT Aero Soln Inhale 1 Puff by mouth 4 times a day.     • GLIPIZIDE by Does not apply route.       No current facility-administered medications for this visit.        ROS: As per HPI. Additional pertinent symptoms as noted below.  Review of Systems   Constitutional: Negative for chills, fever and malaise/fatigue.   HENT: Negative for hearing loss.    Eyes: Negative for photophobia and pain.   Cardiovascular: Negative for chest pain and orthopnea.   Gastrointestinal: Positive for diarrhea ( loose BM, no watery diarrhea). Negative for abdominal pain, nausea and vomiting.   Genitourinary: Negative for flank pain.   Musculoskeletal: Positive for back pain, joint pain and myalgias. Negative for falls and neck pain.   Skin: Negative for itching and rash.   Neurological: Negative for sensory change.   Psychiatric/Behavioral: Positive for " "depression. Negative for hallucinations, memory loss, substance abuse and suicidal ideas. The patient is nervous/anxious. The patient does not have insomnia.          /68   Pulse 95   Temp 36.6 °C (97.8 °F)   Ht 1.6 m (5' 3\")   Wt 90.3 kg (199 lb)   SpO2 96%   BMI 35.25 kg/m²     General:  Middle aged woman appearing older than stated age, with central obesity and round facies, in moderate distress  Eyes: Pupils equal and reactive. EOMI. No scleral icterus. No nystagmus   Lungs: Clear to auscultation bilaterally. No tachypnea or use of accessory mm of respiration.   Cardiovascular: Regular rate and rhythm. Normal S1, S2. No murmurs, rubs or gallops. No heaves or lifts noted.   Abdomen:  Benign. No rebound or guarding noted. Normoactive bowel sounds. No hepatosplenomegaly. Obese pannus   Extremities: No clubbing, cyanosis, edema.  Skin: Clear. No rash or suspicious skin lesions noted.  Neuro: A&O x 4. CN II-XII intact. No resting or intention tremor.  Psych: tearful, distressed. No audiovisual hallucinations. No suicidal ideation.      Note: I have reviewed all pertinent labs and diagnostic tests associated with this visit with specific comments listed under the assessment and plan below    Note: I have reviewed all pertinent labs and diagnostic tests associated with this visit with specific comments listed under the assessment and plan below    Assessment and Plan    F/u with Rheum, optometry and [facility] pain clinic   Pap - next visit (declined again today)    Mammogram referral sent   Obtain mammogram, eye exam, low dose CT before next visit   Repeat HbA1C ordered for Jimmy 15. We will call with results and schedule appropriate f/u     1. Undifferentiated connective tissue disease   - she will need to see rheumatology ASAP due to worsening joint pain   - continue plaquenil and discussed need to see optometry for an eye exam   - counseled on decreasing ibuprofen but pt states her pain is worse without it; " discussed risks of CKD and gastrointestinal bleeding    - it is imperative that she sees Rheum soon     2. Type 2 diabetes mellitus with diabetic polyneuropathy, with long-term current use of insulin (CMS-HCC)  - reports taking insulin NPH differently from her prior prescription with [facility]:    - takes 15 u subcutaneous q AM and 12 u q HS   - her glucose strips that were ordered do not fit her glucometer; she does not remember what glucometer she has; will call clinic with her glucometer name so that we can order refill of appropriate testing strips   - gabapentin (NEURONTIN) 300 MG Cap; Take 1 Cap by mouth 3 times a day.  Dispense: 90 Cap; Refill: 6  - metformin ER (GLUCOPHAGE XR) 500 MG TABLET SR 24 HR; Take 2 Tabs by mouth every day.  Dispense: 60 Tab; Refill: 3  - HEMOGLOBIN A1C; Future    2. Fibromyalgia  - continue gabapentin, nortriptyline   - sertraline (ZOLOFT) 100 MG Tab; Take 1 Tab by mouth every day.  Dispense: 30 Tab; Refill: 6    4. Healthcare maintenance   5. Screening for breast cancer  - pt declines CT lung ca screening since she cannot afford it right now     - MA-SCREEN MAMMO W/CAD-BILAT; Standing  - MA-SCREEN MAMMO W/CAD-BILAT      6. Recurrent major depressive disorder, in partial remission (CMS-HCC)  - no SI, HI or thoughts of self harm   - sertraline (ZOLOFT) 100 MG Tab; Take 1 Tab by mouth every day.  Dispense: 30 Tab; Refill: 6       Preventive care  Flu - 9/2017  TD- 2011  TDaP - 2011  PPSV23 given in 2011 at   Colonoscopy - utd, declines repeat colonoscopy   Zostavax- 2016  HCV - reports she was tested by her prior PCP, negative         Women only  Pap - declined today   Mammogram - referral sent   Dexa - will discuss      Followup: Return in about 3 months (around 3/4/2018).  Multiple comorbid conditions. Level of complexity -- level 5     Signed by: Aidan Castellanos M.D.

## 2017-12-06 VITALS
SYSTOLIC BLOOD PRESSURE: 124 MMHG | BODY MASS INDEX: 35.26 KG/M2 | DIASTOLIC BLOOD PRESSURE: 68 MMHG | OXYGEN SATURATION: 96 % | HEART RATE: 95 BPM | TEMPERATURE: 97.8 F | WEIGHT: 199 LBS | HEIGHT: 63 IN

## 2017-12-06 ASSESSMENT — ENCOUNTER SYMPTOMS
MYALGIAS: 1
NECK PAIN: 0
CHILLS: 0
MEMORY LOSS: 0
HALLUCINATIONS: 0
NAUSEA: 0
DIARRHEA: 1
NERVOUS/ANXIOUS: 1
ORTHOPNEA: 0
SENSORY CHANGE: 0
ABDOMINAL PAIN: 0
BACK PAIN: 1
DEPRESSION: 1
VOMITING: 0
FALLS: 0
EYE PAIN: 0
INSOMNIA: 0
FEVER: 0
PHOTOPHOBIA: 0
FLANK PAIN: 0

## 2017-12-06 ASSESSMENT — LIFESTYLE VARIABLES: SUBSTANCE_ABUSE: 0

## 2018-03-23 ENCOUNTER — OFFICE VISIT (OUTPATIENT)
Dept: INTERNAL MEDICINE | Facility: MEDICAL CENTER | Age: 63
End: 2018-03-23
Payer: MEDICARE

## 2018-03-23 VITALS
SYSTOLIC BLOOD PRESSURE: 116 MMHG | OXYGEN SATURATION: 91 % | TEMPERATURE: 97.8 F | WEIGHT: 197 LBS | HEIGHT: 63 IN | DIASTOLIC BLOOD PRESSURE: 80 MMHG | BODY MASS INDEX: 34.91 KG/M2 | HEART RATE: 109 BPM

## 2018-03-23 DIAGNOSIS — R68.2 DRY MOUTH: ICD-10-CM

## 2018-03-23 DIAGNOSIS — G47.00 INSOMNIA, UNSPECIFIED TYPE: ICD-10-CM

## 2018-03-23 DIAGNOSIS — E11.42 TYPE 2 DIABETES MELLITUS WITH DIABETIC POLYNEUROPATHY, WITH LONG-TERM CURRENT USE OF INSULIN (HCC): ICD-10-CM

## 2018-03-23 DIAGNOSIS — Z79.4 TYPE 2 DIABETES MELLITUS WITH DIABETIC POLYNEUROPATHY, WITH LONG-TERM CURRENT USE OF INSULIN (HCC): ICD-10-CM

## 2018-03-23 PROCEDURE — 92250 FUNDUS PHOTOGRAPHY W/I&R: CPT | Mod: TC,GC | Performed by: INTERNAL MEDICINE

## 2018-03-23 PROCEDURE — 99214 OFFICE O/P EST MOD 30 MIN: CPT | Mod: GC | Performed by: INTERNAL MEDICINE

## 2018-03-23 RX ORDER — TIZANIDINE 2 MG/1
TABLET ORAL
COMMUNITY
Start: 2017-12-29 | End: 2018-04-16

## 2018-03-23 RX ORDER — ASPIRIN 81 MG/1
81 TABLET, CHEWABLE ORAL DAILY
Qty: 100 TAB | Refills: 6 | Status: SHIPPED | OUTPATIENT
Start: 2018-03-23 | End: 2018-07-06 | Stop reason: SDUPTHER

## 2018-03-23 RX ORDER — GABAPENTIN 600 MG/1
TABLET ORAL
COMMUNITY
Start: 2017-12-28 | End: 2018-03-23

## 2018-03-23 RX ORDER — PILOCARPINE HYDROCHLORIDE 5 MG/1
5 TABLET, FILM COATED ORAL 3 TIMES DAILY
Qty: 90 TAB | Refills: 5 | Status: SHIPPED | OUTPATIENT
Start: 2018-03-23 | End: 2018-07-06 | Stop reason: SDUPTHER

## 2018-03-23 RX ORDER — LIDOCAINE 50 MG/G
OINTMENT TOPICAL
COMMUNITY
Start: 2017-12-29 | End: 2018-04-28

## 2018-03-23 RX ORDER — ALBUTEROL SULFATE 2.5 MG/3ML
SOLUTION RESPIRATORY (INHALATION)
COMMUNITY
Start: 2016-04-07 | End: 2018-04-07

## 2018-03-23 RX ORDER — MELATONIN 3 MG
5 TABLET ORAL
Qty: 30 TAB | Refills: 6 | Status: SHIPPED | OUTPATIENT
Start: 2018-03-23 | End: 2018-08-10

## 2018-03-23 ASSESSMENT — PAIN SCALES - GENERAL: PAINLEVEL: 9=SEVERE PAIN

## 2018-03-23 NOTE — PATIENT INSTRUCTIONS
Pap smear at next visit in 5 weeks   Melatonin for sleep. Try it and let me know.   Stop taking insulin completely.   Make appointments with rheumatology and mammogram ASAP.

## 2018-03-24 NOTE — PROGRESS NOTES
Established Patient    Marisa presents today with the following:    CC: 3 mo f/u for type II DM     HPI: Marisa Zayas is a friendly 60 yo White woman with PMHx of undifferentiated connective tissue disease (on Plaquenil),, fibromyalgia, type II DM with neuropathy, HTN and extensive tobacco abuse who presents to clinic today for f/u. Last seen in clinic on 12/4/18. She has still not made an appointment with Rheumatology (referred already) or had a diabetes eye exam.     Type II DM with neuropathy   Has not been taking insulin or checking her blood glucose since her last visit 3 months ago. Denies hypoglycemic symptoms. Did not make an appointment to have an eye exam in the last one year.   Clinic HgA1c 6.9% today.     Undifferentiated connective tissue disease   Did not make an appointment with Rhuematology yet although referral was sent 6 months ago. From chart review, Rheum has tried several times to contact her but she has not called them back. She has been on hydroxychloroquine with refills until 2019. Last CBC in October shows thrombocytopenia with platelets 115. She is agreeable to f/u with them now after having insurance established one month ago. She was previously without insurance and was worried about the costs of seeing consults without insurance.     Tobacco abuse   Still continues to smoke but cut it down since she can't afford it.Now that she has insurance, she is agreeable to f/u with smoking cessation program (referral previously sent).  Understands inflammation from smoking can worsen connective tissue disease. Does not want to try medications to quit smoking at this time.        Obesity   Seen by pain management recently for chronic low back pain, evaluated for surgical implant of pain pump. She believes she can be more active and exercise after the surgery. Does not want a referral to nutrition and weight management currently as she cannot afford to pay for them (limited budget due to SSI).        Healthcare maintenance   Already referred for mammogram (Ms. Zayas needs to schedule it). Needs pap smear but refuses it at every visit. We discussed importance of preventative care and she is agreeable to a pap smear visit in 5 weeks.   Needs pneumonoccal vaccination given her Type II DM. Previously received PPSV 23 in [place], needs PCV 13 followed by repeat PPSV23.   Needs c-scope but refuses it today. She wants to finish seeing her other consultations and referrals first. Denies melena, bloody stools, changes in bowel movements or abdominal pain or new weight loss.     Patient Active Problem List    Diagnosis Date Noted   • Obesity (BMI 30-39.9) 10/30/2017   • Undifferentiated connective tissue disease (CMS-HCC) 10/30/2017   • Essential hypertension 10/30/2017   • Type 2 diabetes mellitus with diabetic polyneuropathy, with long-term current use of insulin (CMS-HCC) 10/30/2017   • Thrombocytopenia (CMS-HCC) 01/28/2016       Current Outpatient Prescriptions   Medication Sig Dispense Refill   • pilocarpine (SALAGEN) 5 MG Tab Take 1 Tab by mouth 3 times a day for 180 days. 90 Tab 5   • albuterol (PROVENTIL) 2.5mg/3ml Nebu Soln solution for nebulization by Nebulization route.     • Melatonin 3-10 MG Tab Take 5 mg by mouth every bedtime. 30 Tab 6   • aspirin (ASA) 81 MG Chew Tab chewable tablet Take 1 Tab by mouth every day. 100 Tab 6   • gabapentin (NEURONTIN) 300 MG Cap Take 1 Cap by mouth 3 times a day. (Patient taking differently: Take 600 mg by mouth 3 times a day.) 90 Cap 6   • sertraline (ZOLOFT) 100 MG Tab Take 1 Tab by mouth every day. 30 Tab 6   • metformin ER (GLUCOPHAGE XR) 500 MG TABLET SR 24 HR Take 2 Tabs by mouth every day. 60 Tab 3   • lisinopril (PRINIVIL) 10 MG Tab Take 1 Tab by mouth every day. 30 Tab 3   • atorvastatin (LIPITOR) 20 MG Tab Take 2 Tabs by mouth every day. TAKE 2 TABLETS BY MOUTH DAILY 30 Tab 3   • nortriptyline (PAMELOR) 25 MG Cap Take 1 Cap by mouth 3 times a day. 30 Cap  "2   • hydroxychloroquine (PLAQUENIL) 200 MG Tab Take 1 tablet by mouth daily     • ONETOUCH DELICA LANCETS FINE Misc by Does not apply route.     • Blood Glucose Monitoring Suppl (ONETOUCH VERIO IQ SYSTEM) w/Device Kit by Does not apply route.     • Cholecalciferol (D 2000) 2000 UNIT Tab Take  by mouth.     • Coenzyme Q10 (COQ-10) 100 MG Cap Take  by mouth.     • ipratropium-albuterol (COMBIVENT RESPIMAT)  MCG/ACT Aero Soln Inhale 1 Puff by mouth 4 times a day.     • lidocaine (XYLOCAINE) 5 % Ointment      • tizanidine (ZANAFLEX) 2 MG tablet      • Insulin Syringe-Needle U-100 (BD INSULIN SYRINGE ULTRAFINE) 31G X 15/64\" 0.3 ML Misc Use for injection as directed     • glucose blood (ONETOUCH VERIO) strip by Does not apply route.     • metronidazole (METROGEL) 1 % gel Apply to affected area(s) 2 times a day       No current facility-administered medications for this visit.        ROS: As per HPI. Additional pertinent symptoms as noted below.  Review of Systems   Constitutional: Negative for chills, fever, malaise/fatigue and weight loss.   HENT: Negative for congestion and sinus pain.    Eyes: Negative for double vision, photophobia and pain.   Respiratory: Negative for cough, shortness of breath and wheezing.    Cardiovascular: Negative for chest pain and palpitations.   Gastrointestinal: Negative for abdominal pain, blood in stool, heartburn, melena, nausea and vomiting.   Genitourinary: Negative for dysuria and hematuria.   Musculoskeletal: Positive for back pain and joint pain. Negative for falls, myalgias and neck pain.   Skin: Negative for itching and rash.   Neurological: Negative for dizziness, loss of consciousness and headaches.   Psychiatric/Behavioral: Negative for hallucinations. The patient has insomnia.          /80   Pulse (!) 109   Temp 36.6 °C (97.8 °F)   Ht 1.6 m (5' 3\")   Wt 89.4 kg (197 lb)   SpO2 91%   Breastfeeding? No   BMI 34.90 kg/m²     Physical Exam   General:  Middle " aged woman appearing older than stated age, with central obesity and round facies, in moderate distress  Eyes: Pupils equal and reactive. EOMI. No scleral icterus. No nystagmus   Lungs: Clear to auscultation bilaterally. No tachypnea or use of accessory mm of respiration.   Cardiovascular: Regular rate and rhythm. Normal S1, S2. No murmurs, rubs or gallops. No heaves or lifts noted.   Abdomen:  Benign. No rebound or guarding noted. Normoactive bowel sounds. No hepatosplenomegaly. Obese pannus   Extremities: No clubbing, cyanosis, edema.  Skin: Clear. No rash or suspicious skin lesions noted.  Neuro: A&O x 4. CN II-XII intact. No resting or intention tremor.  Psych: euthymic, cooperative. No audiovisual hallucinations. No suicidal ideation    Note: I have reviewed all pertinent labs and diagnostic tests associated with this visit with specific comments listed under the assessment and plan below    Assessment and Plan    1. Type 2 diabetes mellitus with diabetic polyneuropathy, with long-term current use of insulin (CMS-HCC)  - POCT A1C 6.9% in office today   - discontinue insulin. Continue metformin XR 1000 mg daily. Will f/u with repeat A1c in 3 months and may increase to 2000 mg daily if needed   - POCT Retinal Eye Exam in clinic today   - aspirin (ASA) 81 MG Chew Tab chewable tablet; Take 1 Tab by mouth every day.  Dispense: 100 Tab; Refill: 6    - for primary prevention given her type II DM.     2. Dry mouth  - due to undifferentiated connective tissue disease   - pilocarpine (SALAGEN) 5 MG Tab; Take 1 Tab by mouth 3 times a day for 180 days.  Dispense: 90 Tab; Refill: 5    3. Insomnia, unspecified type  - Melatonin 3-10 MG Tab; Take 5 mg by mouth every bedtime.  Dispense: 30 Tab; Refill: 6    4. Healthcare maintenance   5. Screening for breast cancer  - pt declines CT lung ca screening since she cannot afford it right now   - also declines c-scope currently; will discuss both at f/u visit   - referral already  sent for mammogram, she is to make an appointment    - MA-SCREEN MAMMO W/CAD-BILAT; Standing      Preventive care  Flu - 9/2017  TD- 2011  TDaP - 2011  PPSV23 given in 2011 at   Colonoscopy - utd, declines repeat colonoscopy   Zostavax- 2016  HCV - negative        Women only  Pap - appointment in 5 weeks   Mammogram - referral sent, she needs to schedule it    DEXA -- discuss at f/u visit     Followup: Return in about 5 weeks (around 4/27/2018) for Short.  Given PPSV 23 in  in [place]. Needs PCV 13 followed by PPSV 23 again.   Pap smear visit in 5 weeks   Discuss f/u of mammogram (already referred)     Signed by: Aidan Castellanos M.D.

## 2018-03-25 ASSESSMENT — ENCOUNTER SYMPTOMS
DOUBLE VISION: 0
BACK PAIN: 1
WEIGHT LOSS: 0
PHOTOPHOBIA: 0
DIZZINESS: 0
SHORTNESS OF BREATH: 0
LOSS OF CONSCIOUSNESS: 0
EYE PAIN: 0
HALLUCINATIONS: 0
CHILLS: 0
SINUS PAIN: 0
HEARTBURN: 0
FEVER: 0
NECK PAIN: 0
WHEEZING: 0
INSOMNIA: 1
HEADACHES: 0
COUGH: 0
MYALGIAS: 0
VOMITING: 0
NAUSEA: 0
FALLS: 0
ABDOMINAL PAIN: 0
BLOOD IN STOOL: 0
PALPITATIONS: 0

## 2018-03-27 LAB — RETINAL SCREEN: NEGATIVE

## 2018-04-03 ENCOUNTER — TELEPHONE (OUTPATIENT)
Dept: INTERNAL MEDICINE | Facility: MEDICAL CENTER | Age: 63
End: 2018-04-03

## 2018-04-03 NOTE — TELEPHONE ENCOUNTER
Per pt request on the day of the last office visit. She would like to see if you could please place a DME order for a POWER NEB COMPRESSOR  Model number: [id number] Serial number: [id number]  Category: C11B type   Mode of operation: Intermittent Manufactured for: [employer]. If you could please include all of the above in the DME order.  Thank you!

## 2018-04-06 ENCOUNTER — TELEPHONE (OUTPATIENT)
Dept: HOSPITALIST | Facility: MEDICAL CENTER | Age: 63
End: 2018-04-06

## 2018-04-06 DIAGNOSIS — J44.89 COPD (CHRONIC OBSTRUCTIVE PULMONARY DISEASE) WITH CHRONIC BRONCHITIS: ICD-10-CM

## 2018-04-09 ENCOUNTER — TELEPHONE (OUTPATIENT)
Dept: INTERNAL MEDICINE | Facility: MEDICAL CENTER | Age: 63
End: 2018-04-09

## 2018-04-09 NOTE — TELEPHONE ENCOUNTER
Pt called and left a vm stating that she has tried Melatonin and she states the medication is not working for her and would like an alternative. Please Advise.

## 2018-04-10 ENCOUNTER — TELEPHONE (OUTPATIENT)
Dept: HOSPITALIST | Facility: MEDICAL CENTER | Age: 63
End: 2018-04-10

## 2018-04-10 DIAGNOSIS — Z98.890 S/P TENDON REPAIR: ICD-10-CM

## 2018-04-13 ENCOUNTER — TELEPHONE (OUTPATIENT)
Dept: HOSPITALIST | Facility: MEDICAL CENTER | Age: 63
End: 2018-04-13

## 2018-04-13 NOTE — TELEPHONE ENCOUNTER
Aidan Castellanos M.D.   You 20 hours ago (6:01 PM)      Yes tried to reach her and left a message. Will call her again.

## 2018-04-14 NOTE — TELEPHONE ENCOUNTER
Called Ms Zayas twice to check up on her and discuss her insomnia, but unable to reach her. Previously recommended that she practice good sleep hygiene and try melatonin. Left voicemail for her to call me if needed. Will call her again.

## 2018-04-16 ENCOUNTER — APPOINTMENT (OUTPATIENT)
Dept: ADMISSIONS | Facility: MEDICAL CENTER | Age: 63
End: 2018-04-16
Attending: PAIN MEDICINE
Payer: MEDICARE

## 2018-04-16 RX ORDER — CYCLOBENZAPRINE HCL 10 MG
10 TABLET ORAL 2 TIMES DAILY PRN
COMMUNITY
End: 2018-07-06 | Stop reason: SDUPTHER

## 2018-04-16 NOTE — OR NURSING
Phone preadmit done. Pt states she has cpap at night but doesn't use because is missing a part and hasn't followed up on getting it. She will bring her inhaler the morning of procedure. She will take her gabapentin, nortriptyline, and zoloft the morning of procedure with small sip of water as per anesthesia protocol. She states she has had a history of low platelets in the past. She is aware of npo instructions pre-procedure.

## 2018-04-18 NOTE — OP REPORT
Surgeon:   Rosa Cat MD  Patient name:   Marisa Zayas  YOB: 1955  Date Seen:   04/19/2018    Assistants: None    Surgeon: Rosa Cat MD    Assistants: None    Preoperative diagnosis: Chronic Pain , failed back surgery syndrome  Post operative diagnosis: Chronic Pain , failed back surgery syndrome    Type of Sedation:  General Anesthesia with ETT  Procedure:  1. Insertion of a Permanent Midline Octrode Spinal Cord Stimulator Lead  2. Insertion of a Permanent Left Octrode Spinal Cord Stimulator Lead  3. Intraoperative Neurostimulation Trial  4. Creation of a  left Implantable Power Generator (IPG) Pocket  5. Implantation of the IPG    Method of Surgery:  After discussing risks, benefits, and alternatives to the procedure, the patient expressed understanding and wished to proceed.  An IV was started in the pre-op area and IV fluid administration was continued throughout the procedure.  The patient was brought into the fluoroscopy suite and placed in the prone position.  Procedural pause was conducted to verify: correct patient identity, procedure to be performed and as applicable, correct side and site, correct patient position, and availability of implants, special equipment or special instruments.  Intravenous sedation appropriate to the procedure was administered by the physician/nurse and is accurately reflected in the nurse's notes. Blood pressure, heart rate, pulse oximetry, and cardiac monitoring were monitored throughout the procedure. The patient's vital signs remained stable throughout the procedure.  EKG monitoring revealed normal sinus rhythm.  A dose of Ancef was administered intravenously prior to starting the procedure.     The skin was sterilely prepped and draped in the usual fashion using chlorhexidine times three in the region that the procedure was to be performed.      Insertion of a Permanent Midline Octrode Spinal Cord Stimulator Lead    The left T12 - L1 interlaminar  space was identified fluoroscopically.  The skin and subcutaneous tissues overlying the left L1 lamina were anesthetized with 1% Lidocaine without epinephrine using a 25G 1.5 inch needle and a 25G 3.5 inch spinal needle for deeper tissues.  Then the 25 gauge 3.5 inch spinal needle was used to inject 0.5% bupivacaine with epinephrine for further local anesthetic control and hemostasis.  A 10-blade scalpel was used to make a 5 cm midline incision overlying the L1 spinous process to the L4 spinous process. Dissection was carried out with bovie to expose the bilateral interlaminar ligaments.  After blunt dissection was completed, the incision was copiously irrigated with bacitracin solution.  A 14-gauge 4.5 inch Tuohy epidural needle was inserted through the incision to the left L1 lamina and was then “walked off” the superior aspect of this lamina into the T12-L1 epidural space.  The epidural space was localized using a loss of resistance technique and intermittent fluoroscopic    guidance.  An octrode spinal cord stimulator lead was then advanced up the midline of the posterior epidural space to the top of T8 vertebra.  The 14-gauge 4.5 inch Tuohy epidural needle was then removed using a push-pull technique under direct fluoroscopic visualization to make sure the lead did not move. The proximal portion of the lead was anchored to the right interlaminar ligament using standard technique with interrupted 2-0 ethiobond sutures and a torpedo anchor.    Insertion of a Permanent Left Octrode Spinal Cord Stimulator Lead    The left T12-L1 interlaminar space was identified fluoroscopically.  The skin and subcutaneous tissues overlying the left L1 lamina were anesthetized with 1% Lidocaine without epinephrine using a 25G 1.5 inch needle and a 25G 3.5 inch spinal needle for deeper tissues.  A 14-gauge 4.5 inch Tuohy epidural needle was inserted through the skin to the left L1 lamina and was then “walked off” the superior aspect  of this lamina into the T12-L1 epidural space.  The epidural space was localized using a loss of resistance technique and intermittent fluoroscopic guidance.  An octrode spinal cord stimulator lead was then advanced up the left side of the posterior epidural space to the top of T9 vertebra.  The 14-gauge 4.5 inch Tuohy epidural needle was then removed using a push-pull technique under direct fluoroscopic visualization to make sure the lead did not move and the end of the lead  was pulled back into the incision side through the skin. The proximal portion of the lead was anchored to the left interlaminar ligament using standard technique with interrupted 2-0 ethibond sutures and a torpedo anchor.     Intraoperative Neurostimulation Trial    At this point, the distal ends of the leads were inserted and locked into the trial stimulator connectors.  Impedance was checked and multiple electrode combinations were utilized to provide paresthesia coverage of the patient's area of pain.  No abdominal or rib/sternal stimulation was noted by the patient.      Creation of a left IPG Pocket    The skin and subcutaneous tissues overlying the skin, left later to midline incision was anesthetized with 1% Lidocaine without epinephrine using a 25G 1.5 inch needle. Dissection was made left lateral to midline incision with mireille, blunt technique and bovie. Hemostasis was achieved.  A generous subcutaneous pocket was created using blunt dissection that was approximately 2 cm deep to the skin's surface.  After it was completed, the pocket was copiously irrigated with bacitracin solution and packed with sterile 4x4's.    The left spinal cord stimulator lead was connected to the inferior connector of the IPG.  The right    spinal cord stimulator lead was connected to the superior connector of the IPG.  After connection to the IPG, the system was activated confirming that the system was operational.    Implantation of the IPG    After the  distal ends of the spinal cord stimulator leads were placed into the IPG, the sterile 4x4's in the IPG pocket were removed.  The remaining length of the spinal cord stimulator leads were coiled underneath the IPG and the IPG was placed into the pocket.  A sponge count was performed and all sponges were accounted for.  2-0 Vicryl was used to close the subcutaneous tissues of the pocket and midline incision with interrupted sutures.  The skin  incision was closed using staples.  An AP spot film was obtained to record the final lead positions.  triple antibiotic was applied over the skin incision.    The patient was seen in recovery and the patient received good stimulation that covered their targeted pain.  The patient was instructed in the proper use of the  and understood its use prior to discharge.  The patient tolerated the procedure well and there were no apparent complications.  After an appropriate amount of observation, the patient was dismissed from the clinic in good condition under their own power.    Complications:  None   Disposition:   1.  The patient was discharged to the recovery area in good condition.  2.  Discharge instructions were provided and explained.  3.  Patient was instructed to call with any questions or problems.  4.  Apply ice to the procedure site and keep clean and dry for 24 hours.  5.  Medications were reviewed for efficacy and appropriateness.  6.  Continue current medications.  7.  Return in 1 to 2 weeks for follow up.                Electronically Signed: BELTRAN VILLANUEVA MD on/at 04/19/2018 09:52:17

## 2018-04-18 NOTE — TELEPHONE ENCOUNTER
Called Ms. Zayas again but still could not reach her. This is attempt #3. Left her a voicemail encouraging her to call the clinic and get back to me regarding her insomnia.   She should make an appointment next week to see me directly in clinic.

## 2018-04-19 ENCOUNTER — HOSPITAL ENCOUNTER (OUTPATIENT)
Facility: MEDICAL CENTER | Age: 63
End: 2018-04-19
Attending: PAIN MEDICINE | Admitting: PAIN MEDICINE
Payer: MEDICARE

## 2018-04-19 ENCOUNTER — APPOINTMENT (OUTPATIENT)
Dept: RADIOLOGY | Facility: MEDICAL CENTER | Age: 63
End: 2018-04-19
Attending: PAIN MEDICINE
Payer: MEDICARE

## 2018-04-19 VITALS
OXYGEN SATURATION: 92 % | TEMPERATURE: 96.8 F | DIASTOLIC BLOOD PRESSURE: 69 MMHG | RESPIRATION RATE: 18 BRPM | BODY MASS INDEX: 36.63 KG/M2 | WEIGHT: 199.08 LBS | HEART RATE: 105 BPM | HEIGHT: 62 IN | SYSTOLIC BLOOD PRESSURE: 119 MMHG

## 2018-04-19 LAB — GLUCOSE BLD-MCNC: 172 MG/DL (ref 65–99)

## 2018-04-19 PROCEDURE — 160046 HCHG PACU - 1ST 60 MINS PHASE II: Performed by: PAIN MEDICINE

## 2018-04-19 PROCEDURE — C1767 GENERATOR, NEURO NON-RECHARG: HCPCS | Performed by: PAIN MEDICINE

## 2018-04-19 PROCEDURE — A6402 STERILE GAUZE <= 16 SQ IN: HCPCS | Performed by: PAIN MEDICINE

## 2018-04-19 PROCEDURE — 700111 HCHG RX REV CODE 636 W/ 250 OVERRIDE (IP)

## 2018-04-19 PROCEDURE — 160036 HCHG PACU - EA ADDL 30 MINS PHASE I: Performed by: PAIN MEDICINE

## 2018-04-19 PROCEDURE — 302699 HCHG ABDOMINAL BINDER: Performed by: PAIN MEDICINE

## 2018-04-19 PROCEDURE — 160002 HCHG RECOVERY MINUTES (STAT): Performed by: PAIN MEDICINE

## 2018-04-19 PROCEDURE — 160041 HCHG SURGERY MINUTES - EA ADDL 1 MIN LEVEL 4: Performed by: PAIN MEDICINE

## 2018-04-19 PROCEDURE — 502240 HCHG MISC OR SUPPLY RC 0272: Performed by: PAIN MEDICINE

## 2018-04-19 PROCEDURE — 160035 HCHG PACU - 1ST 60 MINS PHASE I: Performed by: PAIN MEDICINE

## 2018-04-19 PROCEDURE — C1778 LEAD, NEUROSTIMULATOR: HCPCS | Performed by: PAIN MEDICINE

## 2018-04-19 PROCEDURE — 502000 HCHG MISC OR IMPLANTS RC 0278: Performed by: PAIN MEDICINE

## 2018-04-19 PROCEDURE — 700101 HCHG RX REV CODE 250

## 2018-04-19 PROCEDURE — 160029 HCHG SURGERY MINUTES - 1ST 30 MINS LEVEL 4: Performed by: PAIN MEDICINE

## 2018-04-19 PROCEDURE — 501838 HCHG SUTURE GENERAL: Performed by: PAIN MEDICINE

## 2018-04-19 PROCEDURE — 160009 HCHG ANES TIME/MIN: Performed by: PAIN MEDICINE

## 2018-04-19 PROCEDURE — 82962 GLUCOSE BLOOD TEST: CPT

## 2018-04-19 PROCEDURE — 160025 RECOVERY II MINUTES (STATS): Performed by: PAIN MEDICINE

## 2018-04-19 PROCEDURE — 160048 HCHG OR STATISTICAL LEVEL 1-5: Performed by: PAIN MEDICINE

## 2018-04-19 PROCEDURE — 500448 HCHG DRESSING, TELFA 3X4: Performed by: PAIN MEDICINE

## 2018-04-19 PROCEDURE — 160047 HCHG PACU  - EA ADDL 30 MINS PHASE II: Performed by: PAIN MEDICINE

## 2018-04-19 PROCEDURE — 700105 HCHG RX REV CODE 258

## 2018-04-19 PROCEDURE — 72070 X-RAY EXAM THORAC SPINE 2VWS: CPT

## 2018-04-19 DEVICE — IMPLANTABLE DEVICE: Type: IMPLANTABLE DEVICE | Site: BACK | Status: FUNCTIONAL

## 2018-04-19 RX ORDER — BACITRACIN 65 UNIT/MG
POWDER (GRAM) MISCELLANEOUS
Status: DISCONTINUED | OUTPATIENT
Start: 2018-04-19 | End: 2018-04-19 | Stop reason: HOSPADM

## 2018-04-19 RX ORDER — BUPIVACAINE HYDROCHLORIDE 2.5 MG/ML
INJECTION, SOLUTION EPIDURAL; INFILTRATION; INTRACAUDAL
Status: DISCONTINUED | OUTPATIENT
Start: 2018-04-19 | End: 2018-04-19 | Stop reason: HOSPADM

## 2018-04-19 RX ORDER — SODIUM CHLORIDE, SODIUM LACTATE, POTASSIUM CHLORIDE, CALCIUM CHLORIDE 600; 310; 30; 20 MG/100ML; MG/100ML; MG/100ML; MG/100ML
1000 INJECTION, SOLUTION INTRAVENOUS
Status: COMPLETED | OUTPATIENT
Start: 2018-04-19 | End: 2018-04-19

## 2018-04-19 RX ADMIN — SODIUM CHLORIDE, SODIUM LACTATE, POTASSIUM CHLORIDE, CALCIUM CHLORIDE 1000 ML: 600; 310; 30; 20 INJECTION, SOLUTION INTRAVENOUS at 07:51

## 2018-04-19 ASSESSMENT — PAIN SCALES - GENERAL
PAINLEVEL_OUTOF10: 3
PAINLEVEL_OUTOF10: 0

## 2018-04-19 NOTE — OR NURSING
1037 patient to stage 2  Patient settled in recliner chair post short ambulation from [facility] - pt dressed with assist by RN. Dressing to lower mid back CDI with abdominal binder in place. Pt denies nausea and tolerated transfer well but c/o of some increased pain to incision area but tolerable.   1040 Placed pillow behind back and ice pack over dressing area; pt reports comfort. Pulse ox 81-84% on RA. Instructed patient to DB/C; demonstrated understanding.   1050 Instructed to use IS x 10 every hour while awake; demonstrated understanding. IS max 1900. Pulse ox >90% while using IS.   1110 Instructed patient to rest and not use IS.  1125 Reviewed discharge instructions; no desaturations noted. Plan to monitor for 10 minutes longer at rest to ensure that she is a safe discharge. Given coffee on request and continues to rest.   1129 Report to HERRERA Cota

## 2018-04-19 NOTE — OR NURSING
0933  Received from or  resp spont  abd binder intact   No drainage noted on back  Up to bedpan unable to goo at this time  No c/o pain resp spont  Dressing remains c/d/i  Meets discharge criteria

## 2018-04-19 NOTE — DISCHARGE INSTRUCTIONS
ACTIVITY: Rest and take it easy for the first 24 hours.  A responsible adult is recommended to remain with you during that time.  It is normal to feel sleepy.  We encourage you to not do anything that requires balance, judgment or coordination.    MILD FLU-LIKE SYMPTOMS ARE NORMAL. YOU MAY EXPERIENCE GENERALIZED MUSCLE ACHES, THROAT IRRITATION, HEADACHE AND/OR SOME NAUSEA.    FOR 24 HOURS DO NOT:  Drive, operate machinery or run household appliances.  Drink beer or alcoholic beverages.   Make important decisions or sign legal documents.    SPECIAL INSTRUCTIONS: Wear abdominal binder as instructed    DIET: To avoid nausea, slowly advance diet as tolerated, avoiding spicy or greasy foods for the first day.  Add more substantial food to your diet according to your physician's instructions. INCREASE FLUIDS AND FIBER TO AVOID CONSTIPATION.    SURGICAL DRESSING/BATHING: Do not remove dressing for 3 days  Do not shower for 3 days    FOLLOW-UP APPOINTMENT:  A follow-up appointment should be arranged with your doctor in 10-14 days; call to schedule.    You should CALL YOUR PHYSICIAN if you develop:  Fever greater than 101 degrees F.  Pain not relieved by medication, or persistent nausea or vomiting.  Excessive bleeding (blood soaking through dressing) or unexpected drainage from the wound.  Extreme redness or swelling around the incision site, drainage of pus or foul smelling drainage.  Inability to urinate or empty your bladder within 8 hours.  Problems with breathing or chest pain.    You should call 911 if you develop problems with breathing or chest pain.  If you are unable to contact your doctor or surgical center, you should go to the nearest emergency room or urgent care center.  Physician's telephone #: [phone]    If any questions arise, call your doctor.  If your doctor is not available, please feel free to call the Surgical Center at [phone].  The Center is open Monday through Friday from 7AM to 7PM.  You  can also call the HEALTH HOTLINE open 24 hours/day, 7 days/week and speak to a nurse at [phone], or toll free at [phone].    A registered nurse may call you a few days after your surgery to see how you are doing after your procedure.    MEDICATIONS: Resume taking daily medication.  Take prescribed pain medication with food.  If no medication is prescribed, you may take non-aspirin pain medication if needed.  PAIN MEDICATION CAN BE VERY CONSTIPATING.  Take a stool softener or laxative such as senokot, pericolace, or milk of magnesia if needed.    Prescription given for Keflex.  Last pain medication given at     If your physician has prescribed pain medication that includes Acetaminophen (Tylenol), do not take additional Acetaminophen (Tylenol) while taking the prescribed medication.    Depression / Suicide Risk    As you are discharged from this [facility] facility, it is important to learn how to keep safe from harming yourself.    Recognize the warning signs:  · Abrupt changes in personality, positive or negative- including increase in energy   · Giving away possessions  · Change in eating patterns- significant weight changes-  positive or negative  · Change in sleeping patterns- unable to sleep or sleeping all the time   · Unwillingness or inability to communicate  · Depression  · Unusual sadness, discouragement and loneliness  · Talk of wanting to die  · Neglect of personal appearance   · Rebelliousness- reckless behavior  · Withdrawal from people/activities they love  · Confusion- inability to concentrate     If you or a loved one observes any of these behaviors or has concerns about self-harm, here's what you can do:  · Talk about it- your feelings and reasons for harming yourself  · Remove any means that you might use to hurt yourself (examples: pills, rope, extension cords, firearm)  · Get professional help from the community (Mental Health, Substance Abuse, psychological counseling)  · Do  not be alone:Call your Safe Contact- someone whom you trust who will be there for you.  · Call your local CRISIS HOTLINE 043-2609 or 507-539-9379  · Call your local Children's Mobile Crisis Response Team [place] (869) 239-6318 or www.Kaai  · Call the toll free National Suicide Prevention Hotlines   · National Suicide Prevention Lifeline 416-760-TKHC (3674)  [facility] Line Network 800-SUICIDE (252-7093)    Discharge Education for patients on TORO (Obstructive Sleep Apnea) Protocol    We recommend that you should be with an adult observer for at least 24 hours after your sedation/anesthesia.  If you have a CPAP machine, you should wear it during any sleep period (day or night) for the week following your procedure.  We encourage you to sleep on your side or in a sitting position, even with napping.  Lying flat on your back increases the risk of apnea and airway obstruction during your post procedure recovery period.    It is important to prevent over-sedation that could increase your risk for apnea.  Please take all pain medication as directed by your physician.  If you are not getting pain relief, please contact your physician to discuss possible approaches to relieving pain while minimizing medications that can affect your breathing and oxygen levels.      ·

## 2018-04-27 ENCOUNTER — TELEPHONE (OUTPATIENT)
Dept: INTERNAL MEDICINE | Facility: MEDICAL CENTER | Age: 63
End: 2018-04-27

## 2018-04-27 NOTE — TELEPHONE ENCOUNTER
Called Ms. Marquez since she did not come for a 5 week f/u visit with me this week.   Had surgery and reports ongoing pain from surgery.   However, insomnia is better and she understands the risks of sleeping agents. Agreeable to not give her sleep aid at this time due to fall risk.   She is asked to present to ED if breathing gets significantly worse due to COPD. Otherwise she reports doing fine.     Plan: asked her to schedule f/u with me.

## 2018-04-28 ENCOUNTER — APPOINTMENT (OUTPATIENT)
Dept: RADIOLOGY | Facility: MEDICAL CENTER | Age: 63
DRG: 189 | End: 2018-04-28
Attending: EMERGENCY MEDICINE
Payer: MEDICARE

## 2018-04-28 ENCOUNTER — HOSPITAL ENCOUNTER (INPATIENT)
Facility: MEDICAL CENTER | Age: 63
LOS: 2 days | DRG: 189 | End: 2018-05-01
Attending: EMERGENCY MEDICINE | Admitting: HOSPITALIST
Payer: MEDICARE

## 2018-04-28 ENCOUNTER — APPOINTMENT (OUTPATIENT)
Dept: RADIOLOGY | Facility: MEDICAL CENTER | Age: 63
DRG: 189 | End: 2018-04-28
Attending: HOSPITALIST
Payer: MEDICARE

## 2018-04-28 DIAGNOSIS — Z72.0 TOBACCO ABUSE: ICD-10-CM

## 2018-04-28 DIAGNOSIS — F17.200 CURRENT SMOKER: ICD-10-CM

## 2018-04-28 DIAGNOSIS — R09.02 HYPOXIA: ICD-10-CM

## 2018-04-28 DIAGNOSIS — J45.41 MODERATE PERSISTENT ASTHMA WITH ACUTE EXACERBATION: ICD-10-CM

## 2018-04-28 DIAGNOSIS — J44.1 ACUTE EXACERBATION OF CHRONIC OBSTRUCTIVE PULMONARY DISEASE (COPD) (HCC): ICD-10-CM

## 2018-04-28 DIAGNOSIS — J96.01 ACUTE HYPOXEMIC RESPIRATORY FAILURE (HCC): ICD-10-CM

## 2018-04-28 LAB
ANION GAP SERPL CALC-SCNC: 13 MMOL/L (ref 0–11.9)
BASOPHILS # BLD AUTO: 1.2 % (ref 0–1.8)
BASOPHILS # BLD: 0.07 K/UL (ref 0–0.12)
BLOOD CULTURE HOLD CXBCH: NORMAL
BLOOD CULTURE HOLD CXBCH: NORMAL
BUN SERPL-MCNC: 6 MG/DL (ref 8–22)
CALCIUM SERPL-MCNC: 8.8 MG/DL (ref 8.5–10.5)
CHLORIDE SERPL-SCNC: 103 MMOL/L (ref 96–112)
CO2 SERPL-SCNC: 24 MMOL/L (ref 20–33)
CREAT SERPL-MCNC: 0.47 MG/DL (ref 0.5–1.4)
EKG IMPRESSION: NORMAL
EOSINOPHIL # BLD AUTO: 0.25 K/UL (ref 0–0.51)
EOSINOPHIL NFR BLD: 4.4 % (ref 0–6.9)
ERYTHROCYTE [DISTWIDTH] IN BLOOD BY AUTOMATED COUNT: 46.1 FL (ref 35.9–50)
EST. AVERAGE GLUCOSE BLD GHB EST-MCNC: 148 MG/DL
GLUCOSE BLD-MCNC: 147 MG/DL (ref 65–99)
GLUCOSE BLD-MCNC: 291 MG/DL (ref 65–99)
GLUCOSE SERPL-MCNC: 199 MG/DL (ref 65–99)
HBA1C MFR BLD: 6.8 % (ref 0–5.6)
HCT VFR BLD AUTO: 42.6 % (ref 37–47)
HGB BLD-MCNC: 14.3 G/DL (ref 12–16)
IMM GRANULOCYTES # BLD AUTO: 0.03 K/UL (ref 0–0.11)
IMM GRANULOCYTES NFR BLD AUTO: 0.5 % (ref 0–0.9)
LYMPHOCYTES # BLD AUTO: 1.78 K/UL (ref 1–4.8)
LYMPHOCYTES NFR BLD: 31.7 % (ref 22–41)
MCH RBC QN AUTO: 33 PG (ref 27–33)
MCHC RBC AUTO-ENTMCNC: 33.6 G/DL (ref 33.6–35)
MCV RBC AUTO: 98.4 FL (ref 81.4–97.8)
MONOCYTES # BLD AUTO: 0.42 K/UL (ref 0–0.85)
MONOCYTES NFR BLD AUTO: 7.5 % (ref 0–13.4)
NEUTROPHILS # BLD AUTO: 3.07 K/UL (ref 2–7.15)
NEUTROPHILS NFR BLD: 54.7 % (ref 44–72)
NRBC # BLD AUTO: 0 K/UL
NRBC BLD-RTO: 0 /100 WBC
PLATELET # BLD AUTO: 99 K/UL (ref 164–446)
PMV BLD AUTO: 10.6 FL (ref 9–12.9)
POTASSIUM SERPL-SCNC: 3.5 MMOL/L (ref 3.6–5.5)
RBC # BLD AUTO: 4.33 M/UL (ref 4.2–5.4)
SODIUM SERPL-SCNC: 140 MMOL/L (ref 135–145)
WBC # BLD AUTO: 5.6 K/UL (ref 4.8–10.8)

## 2018-04-28 PROCEDURE — 99220 PR INITIAL OBSERVATION CARE,LEVL III: CPT | Performed by: HOSPITALIST

## 2018-04-28 PROCEDURE — 83036 HEMOGLOBIN GLYCOSYLATED A1C: CPT

## 2018-04-28 PROCEDURE — A9270 NON-COVERED ITEM OR SERVICE: HCPCS | Performed by: EMERGENCY MEDICINE

## 2018-04-28 PROCEDURE — 700102 HCHG RX REV CODE 250 W/ 637 OVERRIDE(OP): Performed by: HOSPITALIST

## 2018-04-28 PROCEDURE — 85025 COMPLETE CBC W/AUTO DIFF WBC: CPT

## 2018-04-28 PROCEDURE — 700102 HCHG RX REV CODE 250 W/ 637 OVERRIDE(OP): Performed by: EMERGENCY MEDICINE

## 2018-04-28 PROCEDURE — 700101 HCHG RX REV CODE 250: Performed by: HOSPITALIST

## 2018-04-28 PROCEDURE — 93005 ELECTROCARDIOGRAM TRACING: CPT | Performed by: HOSPITALIST

## 2018-04-28 PROCEDURE — G0378 HOSPITAL OBSERVATION PER HR: HCPCS

## 2018-04-28 PROCEDURE — 700111 HCHG RX REV CODE 636 W/ 250 OVERRIDE (IP): Performed by: HOSPITALIST

## 2018-04-28 PROCEDURE — 306588 SLEEVE,VASO CALF MED: Performed by: HOSPITALIST

## 2018-04-28 PROCEDURE — 96372 THER/PROPH/DIAG INJ SC/IM: CPT

## 2018-04-28 PROCEDURE — 700101 HCHG RX REV CODE 250: Performed by: EMERGENCY MEDICINE

## 2018-04-28 PROCEDURE — 96376 TX/PRO/DX INJ SAME DRUG ADON: CPT

## 2018-04-28 PROCEDURE — 93010 ELECTROCARDIOGRAM REPORT: CPT | Mod: 76 | Performed by: INTERNAL MEDICINE

## 2018-04-28 PROCEDURE — 87040 BLOOD CULTURE FOR BACTERIA: CPT | Mod: 91

## 2018-04-28 PROCEDURE — 96374 THER/PROPH/DIAG INJ IV PUSH: CPT

## 2018-04-28 PROCEDURE — 71046 X-RAY EXAM CHEST 2 VIEWS: CPT

## 2018-04-28 PROCEDURE — 99285 EMERGENCY DEPT VISIT HI MDM: CPT

## 2018-04-28 PROCEDURE — 99407 BEHAV CHNG SMOKING > 10 MIN: CPT

## 2018-04-28 PROCEDURE — 302135 SEQUENTIAL COMPRESSION MACHINE: Performed by: HOSPITALIST

## 2018-04-28 PROCEDURE — 94640 AIRWAY INHALATION TREATMENT: CPT

## 2018-04-28 PROCEDURE — 82962 GLUCOSE BLOOD TEST: CPT

## 2018-04-28 PROCEDURE — A9270 NON-COVERED ITEM OR SERVICE: HCPCS | Performed by: INTERNAL MEDICINE

## 2018-04-28 PROCEDURE — 93005 ELECTROCARDIOGRAM TRACING: CPT | Performed by: EMERGENCY MEDICINE

## 2018-04-28 PROCEDURE — 80048 BASIC METABOLIC PNL TOTAL CA: CPT

## 2018-04-28 PROCEDURE — A9270 NON-COVERED ITEM OR SERVICE: HCPCS | Performed by: HOSPITALIST

## 2018-04-28 PROCEDURE — 36415 COLL VENOUS BLD VENIPUNCTURE: CPT

## 2018-04-28 PROCEDURE — 700111 HCHG RX REV CODE 636 W/ 250 OVERRIDE (IP): Performed by: EMERGENCY MEDICINE

## 2018-04-28 PROCEDURE — 304561 HCHG STAT O2

## 2018-04-28 PROCEDURE — 700102 HCHG RX REV CODE 250 W/ 637 OVERRIDE(OP): Performed by: INTERNAL MEDICINE

## 2018-04-28 RX ORDER — METFORMIN HYDROCHLORIDE 500 MG/1
1000 TABLET, EXTENDED RELEASE ORAL DAILY
Status: DISCONTINUED | OUTPATIENT
Start: 2018-04-28 | End: 2018-05-01 | Stop reason: HOSPADM

## 2018-04-28 RX ORDER — MELATONIN 3 MG
5 TABLET ORAL
Status: DISCONTINUED | OUTPATIENT
Start: 2018-04-28 | End: 2018-04-28

## 2018-04-28 RX ORDER — BISACODYL 10 MG
10 SUPPOSITORY, RECTAL RECTAL
Status: DISCONTINUED | OUTPATIENT
Start: 2018-04-28 | End: 2018-05-01 | Stop reason: HOSPADM

## 2018-04-28 RX ORDER — ZOLPIDEM TARTRATE 5 MG/1
5 TABLET ORAL ONCE
Status: COMPLETED | OUTPATIENT
Start: 2018-04-28 | End: 2018-04-28

## 2018-04-28 RX ORDER — POLYETHYLENE GLYCOL 3350 17 G/17G
1 POWDER, FOR SOLUTION ORAL
Status: DISCONTINUED | OUTPATIENT
Start: 2018-04-28 | End: 2018-05-01 | Stop reason: HOSPADM

## 2018-04-28 RX ORDER — ALBUTEROL SULFATE 90 UG/1
2 AEROSOL, METERED RESPIRATORY (INHALATION) EVERY 6 HOURS PRN
Qty: 1 INHALER | Refills: 0 | Status: SHIPPED | OUTPATIENT
Start: 2018-04-28 | End: 2018-05-01

## 2018-04-28 RX ORDER — METRONIDAZOLE 10 MG/G
1 GEL TOPICAL 2 TIMES DAILY
COMMUNITY
End: 2018-07-06

## 2018-04-28 RX ORDER — LISINOPRIL 10 MG/1
10 TABLET ORAL DAILY
Status: DISCONTINUED | OUTPATIENT
Start: 2018-04-28 | End: 2018-05-01 | Stop reason: HOSPADM

## 2018-04-28 RX ORDER — DOXYCYCLINE 100 MG/1
100 CAPSULE ORAL 2 TIMES DAILY
Qty: 20 CAP | Refills: 0 | Status: SHIPPED | OUTPATIENT
Start: 2018-04-28 | End: 2018-05-01

## 2018-04-28 RX ORDER — GABAPENTIN 300 MG/1
600 CAPSULE ORAL 3 TIMES DAILY
Status: DISCONTINUED | OUTPATIENT
Start: 2018-04-28 | End: 2018-05-01 | Stop reason: HOSPADM

## 2018-04-28 RX ORDER — ASPIRIN 81 MG/1
81 TABLET, CHEWABLE ORAL DAILY
Status: DISCONTINUED | OUTPATIENT
Start: 2018-04-29 | End: 2018-05-01 | Stop reason: HOSPADM

## 2018-04-28 RX ORDER — AMOXICILLIN 250 MG
2 CAPSULE ORAL 2 TIMES DAILY
Status: DISCONTINUED | OUTPATIENT
Start: 2018-04-28 | End: 2018-05-01 | Stop reason: HOSPADM

## 2018-04-28 RX ORDER — CYCLOBENZAPRINE HCL 10 MG
10 TABLET ORAL 2 TIMES DAILY PRN
Status: DISCONTINUED | OUTPATIENT
Start: 2018-04-28 | End: 2018-05-01 | Stop reason: HOSPADM

## 2018-04-28 RX ORDER — IPRATROPIUM BROMIDE AND ALBUTEROL SULFATE 2.5; .5 MG/3ML; MG/3ML
3 SOLUTION RESPIRATORY (INHALATION)
Status: DISCONTINUED | OUTPATIENT
Start: 2018-04-28 | End: 2018-04-29

## 2018-04-28 RX ORDER — METHYLPREDNISOLONE SODIUM SUCCINATE 40 MG/ML
40 INJECTION, POWDER, LYOPHILIZED, FOR SOLUTION INTRAMUSCULAR; INTRAVENOUS EVERY 6 HOURS
Status: COMPLETED | OUTPATIENT
Start: 2018-04-28 | End: 2018-05-01

## 2018-04-28 RX ORDER — ATORVASTATIN CALCIUM 40 MG/1
40 TABLET, FILM COATED ORAL DAILY
Status: DISCONTINUED | OUTPATIENT
Start: 2018-04-29 | End: 2018-05-01 | Stop reason: HOSPADM

## 2018-04-28 RX ORDER — NORTRIPTYLINE HYDROCHLORIDE 25 MG/1
25 CAPSULE ORAL 3 TIMES DAILY
Status: DISCONTINUED | OUTPATIENT
Start: 2018-04-28 | End: 2018-05-01 | Stop reason: HOSPADM

## 2018-04-28 RX ORDER — MORPHINE SULFATE 4 MG/ML
4 INJECTION, SOLUTION INTRAMUSCULAR; INTRAVENOUS
Status: DISCONTINUED | OUTPATIENT
Start: 2018-04-28 | End: 2018-05-01 | Stop reason: HOSPADM

## 2018-04-28 RX ORDER — DEXTROSE MONOHYDRATE 25 G/50ML
25 INJECTION, SOLUTION INTRAVENOUS
Status: DISCONTINUED | OUTPATIENT
Start: 2018-04-28 | End: 2018-05-01 | Stop reason: HOSPADM

## 2018-04-28 RX ORDER — PILOCARPINE HYDROCHLORIDE 5 MG/1
5 TABLET, FILM COATED ORAL 3 TIMES DAILY
Status: DISCONTINUED | OUTPATIENT
Start: 2018-04-28 | End: 2018-05-01 | Stop reason: HOSPADM

## 2018-04-28 RX ORDER — SERTRALINE HYDROCHLORIDE 100 MG/1
100 TABLET, FILM COATED ORAL DAILY
Status: DISCONTINUED | OUTPATIENT
Start: 2018-04-29 | End: 2018-05-01 | Stop reason: HOSPADM

## 2018-04-28 RX ORDER — GABAPENTIN 300 MG/1
600 CAPSULE ORAL 3 TIMES DAILY
COMMUNITY
End: 2021-04-29

## 2018-04-28 RX ORDER — PREDNISONE 20 MG/1
60 TABLET ORAL ONCE
Status: COMPLETED | OUTPATIENT
Start: 2018-04-28 | End: 2018-04-28

## 2018-04-28 RX ORDER — DOXYCYCLINE 100 MG/1
100 TABLET ORAL ONCE
Status: COMPLETED | OUTPATIENT
Start: 2018-04-28 | End: 2018-04-28

## 2018-04-28 RX ORDER — OXYCODONE HYDROCHLORIDE 10 MG/1
10 TABLET ORAL
Status: DISCONTINUED | OUTPATIENT
Start: 2018-04-28 | End: 2018-05-01 | Stop reason: HOSPADM

## 2018-04-28 RX ORDER — PROMETHAZINE HYDROCHLORIDE 25 MG/1
12.5-25 TABLET ORAL EVERY 4 HOURS PRN
Status: DISCONTINUED | OUTPATIENT
Start: 2018-04-28 | End: 2018-05-01 | Stop reason: HOSPADM

## 2018-04-28 RX ORDER — ACETAMINOPHEN 325 MG/1
650 TABLET ORAL EVERY 6 HOURS PRN
Status: DISCONTINUED | OUTPATIENT
Start: 2018-04-28 | End: 2018-05-01 | Stop reason: HOSPADM

## 2018-04-28 RX ORDER — PREDNISONE 20 MG/1
60 TABLET ORAL DAILY
Qty: 12 TAB | Refills: 0 | Status: SHIPPED | OUTPATIENT
Start: 2018-04-28 | End: 2018-05-01

## 2018-04-28 RX ORDER — ONDANSETRON 2 MG/ML
4 INJECTION INTRAMUSCULAR; INTRAVENOUS EVERY 4 HOURS PRN
Status: DISCONTINUED | OUTPATIENT
Start: 2018-04-28 | End: 2018-05-01 | Stop reason: HOSPADM

## 2018-04-28 RX ORDER — HYDROXYCHLOROQUINE SULFATE 200 MG/1
200 TABLET, FILM COATED ORAL DAILY
Status: DISCONTINUED | OUTPATIENT
Start: 2018-04-29 | End: 2018-05-01 | Stop reason: HOSPADM

## 2018-04-28 RX ORDER — CEPHALEXIN 500 MG/1
500 CAPSULE ORAL 2 TIMES DAILY
Status: ON HOLD | COMMUNITY
Start: 2018-04-19 | End: 2018-05-01

## 2018-04-28 RX ORDER — DOXYCYCLINE 100 MG/1
100 TABLET ORAL EVERY 12 HOURS
Status: DISCONTINUED | OUTPATIENT
Start: 2018-04-28 | End: 2018-05-01 | Stop reason: HOSPADM

## 2018-04-28 RX ORDER — ONDANSETRON 4 MG/1
4 TABLET, ORALLY DISINTEGRATING ORAL EVERY 4 HOURS PRN
Status: DISCONTINUED | OUTPATIENT
Start: 2018-04-28 | End: 2018-05-01 | Stop reason: HOSPADM

## 2018-04-28 RX ORDER — OXYCODONE HYDROCHLORIDE 5 MG/1
5 TABLET ORAL
Status: DISCONTINUED | OUTPATIENT
Start: 2018-04-28 | End: 2018-05-01 | Stop reason: HOSPADM

## 2018-04-28 RX ORDER — IBUPROFEN 800 MG/1
800 TABLET ORAL 3 TIMES DAILY PRN
Status: DISCONTINUED | OUTPATIENT
Start: 2018-04-28 | End: 2018-05-01 | Stop reason: HOSPADM

## 2018-04-28 RX ORDER — PROMETHAZINE HYDROCHLORIDE 25 MG/1
12.5-25 SUPPOSITORY RECTAL EVERY 4 HOURS PRN
Status: DISCONTINUED | OUTPATIENT
Start: 2018-04-28 | End: 2018-05-01 | Stop reason: HOSPADM

## 2018-04-28 RX ADMIN — IPRATROPIUM BROMIDE AND ALBUTEROL SULFATE 3 ML: .5; 3 SOLUTION RESPIRATORY (INHALATION) at 22:39

## 2018-04-28 RX ADMIN — IBUPROFEN 800 MG: 800 TABLET, FILM COATED ORAL at 21:19

## 2018-04-28 RX ADMIN — IPRATROPIUM BROMIDE 0.5 MG: 0.5 SOLUTION RESPIRATORY (INHALATION) at 10:47

## 2018-04-28 RX ADMIN — METHYLPREDNISOLONE SODIUM SUCCINATE 40 MG: 40 INJECTION, POWDER, FOR SOLUTION INTRAMUSCULAR; INTRAVENOUS at 22:15

## 2018-04-28 RX ADMIN — GABAPENTIN 600 MG: 300 CAPSULE ORAL at 21:18

## 2018-04-28 RX ADMIN — PREDNISONE 60 MG: 20 TABLET ORAL at 10:29

## 2018-04-28 RX ADMIN — ALBUTEROL SULFATE 2.5 MG: 2.5 SOLUTION RESPIRATORY (INHALATION) at 10:47

## 2018-04-28 RX ADMIN — LISINOPRIL 10 MG: 10 TABLET ORAL at 16:15

## 2018-04-28 RX ADMIN — ZOLPIDEM TARTRATE 5 MG: 5 TABLET ORAL at 22:15

## 2018-04-28 RX ADMIN — PILOCARPINE HYDROCHLORIDE 5 MG: 5 TABLET, FILM COATED ORAL at 21:19

## 2018-04-28 RX ADMIN — NORTRIPTYLINE HYDROCHLORIDE 25 MG: 25 CAPSULE ORAL at 16:16

## 2018-04-28 RX ADMIN — METFORMIN HYDROCHLORIDE 1000 MG: 500 TABLET, EXTENDED RELEASE ORAL at 16:16

## 2018-04-28 RX ADMIN — ALBUTEROL SULFATE 10 MG/HR: 5 SOLUTION RESPIRATORY (INHALATION) at 11:50

## 2018-04-28 RX ADMIN — IPRATROPIUM BROMIDE AND ALBUTEROL SULFATE 3 ML: .5; 3 SOLUTION RESPIRATORY (INHALATION) at 18:54

## 2018-04-28 RX ADMIN — NORTRIPTYLINE HYDROCHLORIDE 25 MG: 25 CAPSULE ORAL at 21:19

## 2018-04-28 RX ADMIN — DOXYCYCLINE 100 MG: 100 TABLET ORAL at 21:20

## 2018-04-28 RX ADMIN — METHYLPREDNISOLONE SODIUM SUCCINATE 40 MG: 40 INJECTION, POWDER, FOR SOLUTION INTRAMUSCULAR; INTRAVENOUS at 16:14

## 2018-04-28 RX ADMIN — INSULIN HUMAN 7 UNITS: 100 INJECTION, SOLUTION PARENTERAL at 21:24

## 2018-04-28 RX ADMIN — DOXYCYCLINE 100 MG: 100 TABLET ORAL at 12:11

## 2018-04-28 RX ADMIN — PILOCARPINE HYDROCHLORIDE 5 MG: 5 TABLET, FILM COATED ORAL at 16:18

## 2018-04-28 RX ADMIN — STANDARDIZED SENNA CONCENTRATE AND DOCUSATE SODIUM 2 TABLET: 8.6; 5 TABLET, FILM COATED ORAL at 16:15

## 2018-04-28 RX ADMIN — OXYCODONE HYDROCHLORIDE 10 MG: 10 TABLET ORAL at 21:17

## 2018-04-28 RX ADMIN — GABAPENTIN 600 MG: 300 CAPSULE ORAL at 16:15

## 2018-04-28 RX ADMIN — ENOXAPARIN SODIUM 40 MG: 100 INJECTION SUBCUTANEOUS at 16:15

## 2018-04-28 ASSESSMENT — COPD QUESTIONNAIRES
DURING THE PAST 4 WEEKS HOW MUCH DID YOU FEEL SHORT OF BREATH: NONE/LITTLE OF THE TIME
COPD SCREENING SCORE: 4
HAVE YOU SMOKED AT LEAST 100 CIGARETTES IN YOUR ENTIRE LIFE: YES
DO YOU EVER COUGH UP ANY MUCUS OR PHLEGM?: NO/ONLY WITH OCCASIONAL COLDS OR INFECTIONS

## 2018-04-28 ASSESSMENT — PATIENT HEALTH QUESTIONNAIRE - PHQ9
2. FEELING DOWN, DEPRESSED, IRRITABLE, OR HOPELESS: NOT AT ALL
SUM OF ALL RESPONSES TO PHQ9 QUESTIONS 1 AND 2: 0
1. LITTLE INTEREST OR PLEASURE IN DOING THINGS: NOT AT ALL

## 2018-04-28 ASSESSMENT — LIFESTYLE VARIABLES
EVER_SMOKED: YES
ALCOHOL_USE: NO

## 2018-04-28 ASSESSMENT — PAIN SCALES - GENERAL
PAINLEVEL_OUTOF10: 7
PAINLEVEL_OUTOF10: 3

## 2018-04-28 NOTE — ED NOTES
Med rec updated and complete.  Allergies reviewed.  Pt is currently taking an antibiotic.  Placed call to pts home pharmacy to verify .  Pt unable to recall name or strength.  Pt ran out of her metformin. Family (sister)  At bedside. Pt stated ok to speak with her with family present.

## 2018-04-28 NOTE — ED TRIAGE NOTES
"Pt c/o sob, hx of asthma. Pt states \"started as allergies last week, ran out of allergy medication and too expensive.\" pt speaking full sentences.   "

## 2018-04-28 NOTE — ED NOTES
Seen by ERP, PIV established, blood drawn and sent to lab, medicated per MAR, RT called for breathing tx.

## 2018-04-28 NOTE — ED NOTES
Pt ambulated around the ER. Pt oxygen saturations dropped down to 77. And while sitting mid 80s.   ERP notified

## 2018-04-28 NOTE — ED PROVIDER NOTES
ED Provider Note    Scribed for Florencio Watkins M.D. by Rose Kaufman. 4/28/2018  10:11 AM    Primary care provider: Aidan Castellanos M.D.  Means of arrival: Walk-in  History obtained from: Patient  History limited by: None     CHIEF COMPLAINT  Chief Complaint   Patient presents with   • Shortness of Breath     HPI  Marisa Zayas is a 62 y.o. female with history of asthma presents to the Emergency Department for evaluation of shortness of breath that began two days ago. Patient reports she has had dry cough and rhinorrhea over this past week secondary to seasonal allergies. She states cough has been worsening with shortness of breath onset two days ago and sputum production. Denies fever, chest pain, leg swelling, abdominal pain, nausea, vomiting, or hematochezia. Patient was taking allergy medications put ran out this past week. Patient reports history of supraventricular tachycardia. Denies history of COPD or at home oxygen. Patient is a current everyday cigarette smoker.  No home oxygen.    REVIEW OF SYSTEMS  Pertinent positives include: shortness of breath, cough, seasonal allergies.  Pertinent negatives include: fever, chest pain, leg swelling, abdominal pain, nausea, vomiting, or hematochezia.  10+ systems reviewed and negative.  C    PAST MEDICAL HISTORY  Past Medical History:   Diagnosis Date   • Arrhythmia 1992    SVT --ablation; now under control   • Arthritis     All joints; osteoarthritis   • Asthma    • Depression    • Diabetes (HCC)    • Fibromyalgia 2001   • Hemorrhagic disorder (HCC)     history of low platelets   • High cholesterol    • Hypertension    • Lupus 2000   • Lupus (systemic lupus erythematosus) ([facility])    • Sleep apnea     not using cpap--missing a part and hasn't followed up   • SVT (supraventricular tachycardia) ([facility])      FAMILY HISTORY  Family History   Problem Relation Age of Onset   • Heart Disease Mother 72     Valve repair   • Heart Disease Sister 50     Cardiac arrest, Valve  repair   • Genetic Daughter 30     multiple sclerosis     SOCIAL HISTORY  Social History   Substance Use Topics   • Smoking status: Current Every Day Smoker     Packs/day: 0.50     Years: 20.00   • Smokeless tobacco: Never Used      Comment: 1 ppd since teenager   • Alcohol use No     History   Drug Use No     SURGICAL HISTORY  Past Surgical History:   Procedure Laterality Date   • SPINAL CORD STIMULATOR  4/19/2018    Procedure: SPINAL CORD STIMULATOR;  Surgeon: Rosa Cat M.D.;  Location: SURGERY [facility];  Service: Pain Management   • OTHER ORTHOPEDIC SURGERY  2016    back--lumbar fusion   • OTHER ORTHOPEDIC SURGERY  1990    back--lumbar fusion   • APPENDECTOMY     • CHOLECYSTECTOMY     • LAMINOTOMY     • TONSILLECTOMY         CURRENT MEDICATIONS  No current facility-administered medications on file prior to encounter.      Current Outpatient Prescriptions on File Prior to Encounter   Medication Sig Dispense Refill   • cyclobenzaprine (FLEXERIL) 10 MG Tab Take 10 mg by mouth 2 times a day as needed.     • pilocarpine (SALAGEN) 5 MG Tab Take 1 Tab by mouth 3 times a day for 180 days. 90 Tab 5   • lidocaine (XYLOCAINE) 5 % Ointment      • Melatonin 3-10 MG Tab Take 5 mg by mouth every bedtime. 30 Tab 6   • aspirin (ASA) 81 MG Chew Tab chewable tablet Take 1 Tab by mouth every day. 100 Tab 6   • gabapentin (NEURONTIN) 300 MG Cap Take 1 Cap by mouth 3 times a day. (Patient taking differently: Take 600 mg by mouth 3 times a day.) 90 Cap 6   • sertraline (ZOLOFT) 100 MG Tab Take 1 Tab by mouth every day. 30 Tab 6   • metformin ER (GLUCOPHAGE XR) 500 MG TABLET SR 24 HR Take 2 Tabs by mouth every day. 60 Tab 3   • lisinopril (PRINIVIL) 10 MG Tab Take 1 Tab by mouth every day. 30 Tab 3   • atorvastatin (LIPITOR) 20 MG Tab Take 2 Tabs by mouth every day. TAKE 2 TABLETS BY MOUTH DAILY 30 Tab 3   • nortriptyline (PAMELOR) 25 MG Cap Take 1 Cap by mouth 3 times a day. 30 Cap 2   • hydroxychloroquine (PLAQUENIL) 200  "MG Tab Take 1 tablet by mouth daily     • metronidazole (METROGEL) 1 % gel Apply to affected area(s) 2 times a day     • ipratropium-albuterol (COMBIVENT RESPIMAT)  MCG/ACT Aero Soln Inhale 1 Puff by mouth 4 times a day.       ALLERGIES  No Known Allergies    PHYSICAL EXAM  VITAL SIGNS: /92   Pulse (!) 105   Temp 36.6 °C (97.9 °F)   Resp (!) 22   Ht 1.6 m (5' 3\")   Wt 90.3 kg (199 lb)   SpO2 93%   BMI 35.25 kg/m²   Reviewed and hypoxic at 82% on room air with normal oxygenation on supplemental oxygen by nasal cannula, tachycardic, borderline blood pressure elevation  Constitutional: Well developed, Well nourished, moderately obese, speaking full sentences.  HENT: Normocephalic, atraumatic, bilateral external ears normal, oropharynx moist, No exudates or erythema.   Eyes: PERRLA, conjunctiva pink, no scleral icterus.   Cardiovascular: Regular rate and rhythm. No murmurs, rubs or gallops, No JVD  Respiratory: Speaking full sentences, Diffuse expiratory wheezing and rhonchi.   Abdominal:  Abdomen soft, non-tender, non distended. No rebound, or guarding.   Skin: No erythema, no rash.   Genitourinary: No costovertebral angle tenderness.   Musculoskeletal: No edema. No chords, Negative Vasiliy's sign  Neurologic: Alert & oriented x 3, cranial nerves 2-12 intact by passive exam.  No focal deficit noted.  Psychiatric: Affect normal, Judgment normal, Mood normal.     DIFFERENTIAL DIAGNOSIS:  Asthma exacerbation, COPD exacerbation, pneumonia, congestive heart failure.    EKG Interpretation:  Interpreted by me    Rhythm:  Tachycardic   Rate: 101   Axis: 98, Right Axis deviation   Ectopy: none  Conduction: normal  ST Segments: no acute change  T Waves: T Wave Flattening   Q Waves: Subtle Q waves  Clinical Impression: Sinus tachycardic with ventriclar bigeminy and right axis deviation.      RADIOLOGY/PROCEDURES  DX-CHEST-2 VIEWS   Final Result      Bibasilar atelectasis.        Radiologist interpretation have " been reviewed by me.     LABORATORY:  Results for orders placed or performed during the hospital encounter of 04/28/18   CBC WITH DIFFERENTIAL   Result Value Ref Range    WBC 5.6 4.8 - 10.8 K/uL    RBC 4.33 4.20 - 5.40 M/uL    Hemoglobin 14.3 12.0 - 16.0 g/dL    Hematocrit 42.6 37.0 - 47.0 %    MCV 98.4 (H) 81.4 - 97.8 fL    MCH 33.0 27.0 - 33.0 pg    MCHC 33.6 33.6 - 35.0 g/dL    RDW 46.1 35.9 - 50.0 fL    Platelet Count 99 (L) 164 - 446 K/uL    MPV 10.6 9.0 - 12.9 fL    Neutrophils-Polys 54.70 44.00 - 72.00 %    Lymphocytes 31.70 22.00 - 41.00 %    Monocytes 7.50 0.00 - 13.40 %    Eosinophils 4.40 0.00 - 6.90 %    Basophils 1.20 0.00 - 1.80 %    Immature Granulocytes 0.50 0.00 - 0.90 %    Nucleated RBC 0.00 /100 WBC    Neutrophils (Absolute) 3.07 2.00 - 7.15 K/uL    Lymphs (Absolute) 1.78 1.00 - 4.80 K/uL    Monos (Absolute) 0.42 0.00 - 0.85 K/uL    Eos (Absolute) 0.25 0.00 - 0.51 K/uL    Baso (Absolute) 0.07 0.00 - 0.12 K/uL    Immature Granulocytes (abs) 0.03 0.00 - 0.11 K/uL    NRBC (Absolute) 0.00 K/uL   BASIC METABOLIC PANEL   Result Value Ref Range    Sodium 140 135 - 145 mmol/L    Potassium 3.5 (L) 3.6 - 5.5 mmol/L    Chloride 103 96 - 112 mmol/L    Co2 24 20 - 33 mmol/L    Glucose 199 (H) 65 - 99 mg/dL    Bun 6 (L) 8 - 22 mg/dL    Creatinine 0.47 (L) 0.50 - 1.40 mg/dL    Calcium 8.8 8.5 - 10.5 mg/dL    Anion Gap 13.0 (H) 0.0 - 11.9     Lab results reviewed by me.     INTERVENTIONS:  Medications   albuterol (PROVENTIL) 2.5 mg/0.5 mL nebulizer solution (not administered)   doxycycline monohydrate (ADOXA) tablet 100 mg (not administered)   predniSONE (DELTASONE) tablet 60 mg (60 mg Oral Given 4/28/18 1029)   albuterol (PROVENTIL) 2.5mg/0.5ml nebulizer solution 2.5 mg (2.5 mg Nebulization Given 4/28/18 1047)   ipratropium (ATROVENT) 0.02 % nebulizer solution 0.5 mg (0.5 mg Nebulization Given 4/28/18 1047)     Response: Persistent hypoxia to 77% on room air with ambulation.    ED COURSE:    10:11 AM - Patient  seen and examined at bedside. Patient will be treated with Ipratropium 0.02% nebulizer, albuterol 2.5 mg/0.5 mL, prednisone tablet 60 mg for her symptoms. Ordered Small Volume Nebulizer, BMP, CBC with differential, DX-chest to evaluate.     11:20 AM - Recheck: Patient still has rhonchi. Patient will be treated with repeat albuterol 2.5 mg/0.5 mL and doxycycline monohydrate tablet 100 mg.      12:09 PM - I removed patient from oxygen and will continue to monitor if patient can breath well on room air.      12:19 PM - Recheck: Patient is stating 82% SpO2 on room air. Patient will be admitted for further treatment.     12:21 PM -  I discussed the patient's case and the above findings with Dr. Galvan (hospitalist) who agrees to admit patient.       MEDICAL DECISION MAKING:  This patient presents with a moderate persistent asthma exacerbation with significant hypoxia. There is no pneumonia or heart failure. Patient denies a history of COPD but does smoke which certainly has made asthma worse    PLAN:  Patient will be admitted to Dr Galvan (hospitalist) liters, steroids, antibiotics, oxygen.     CONDITION: Guarded.     FINAL IMPRESSION  1. Acute exacerbation of chronic obstructive pulmonary disease (COPD) ([facility])          Rose SAEED (Scribe), am scribing for, and in the presence of, Florencio Watkins M.D..    Electronically signed by: Rose Kaufman (Scribe), 4/28/2018    Florencio SAEED M.D. personally performed the services described in this documentation, as scribed by Rose Kaufman in my presence, and it is both accurate and complete.    The note accurately reflects work and decisions made by me.  Florencio Watkins  4/28/2018  12:51 PM

## 2018-04-28 NOTE — PROGRESS NOTES
Received pt from ED in [facility],on arrival pt awake,a&ox4,pt is tachycardic with hr-118/mt,with mod distress in breathing,on o2 3l/nc,o2 sat 93%,productive cough and with wheezing.POC explained.

## 2018-04-28 NOTE — DISCHARGE PLANNING
Care Transition Team Assessment    Information Source  Orientation : Oriented x 4  Information Given By: Patient  Who is responsible for making decisions for patient? : Patient         Elopement Risk  Legal Hold: No    Interdisciplinary Discharge Planning  Lives with - Patient's Self Care Capacity: Other (Comments) (sister Paola and mom)  Support Systems: Family Member(s)  Able to Return to Previous ADL's: Yes  Mobility Issues: No  Prior Services: None  Patient Expects to be Discharged to:: home  Assistance Needed: No  Durable Medical Equipment: Unknown (may need new nebulizer)              Finances  Financial Barriers to Discharge: No  Prescription Coverage: Yes                   Domestic Abuse  Have you ever been the victim of abuse or violence?: No         Discharge Risks or Barriers  Discharge risks or barriers?: No

## 2018-04-28 NOTE — ED NOTES
[place] Fall Risk Assessment Completed, bed locked and in lowest position, call light and personal belongings within reach, significant other at bedside.

## 2018-04-28 NOTE — RESPIRATORY CARE
COPD EDUCATION by COPD CLINICAL EDUCATOR  4/28/2018  at  4:02 PM by Essence Anderson     Patient interviewed by COPD education team. Short intervention has been conducted.  A comprehensive packet including information about smoking cessation given.

## 2018-04-29 PROBLEM — D84.9 IMMUNOCOMPROMISED (HCC): Status: ACTIVE | Noted: 2018-04-29

## 2018-04-29 PROBLEM — J44.1 ACUTE EXACERBATION OF CHRONIC OBSTRUCTIVE PULMONARY DISEASE (COPD) (HCC): Status: ACTIVE | Noted: 2018-04-29

## 2018-04-29 PROBLEM — I47.10 PAROXYSMAL SVT (SUPRAVENTRICULAR TACHYCARDIA) (HCC): Status: ACTIVE | Noted: 2018-04-29

## 2018-04-29 PROBLEM — J96.01 ACUTE HYPOXEMIC RESPIRATORY FAILURE (HCC): Status: ACTIVE | Noted: 2018-04-29

## 2018-04-29 PROBLEM — M32.9 LUPUS (SYSTEMIC LUPUS ERYTHEMATOSUS) (HCC): Status: ACTIVE | Noted: 2018-04-29

## 2018-04-29 LAB
ANION GAP SERPL CALC-SCNC: 14 MMOL/L (ref 0–11.9)
BUN SERPL-MCNC: 15 MG/DL (ref 8–22)
CALCIUM SERPL-MCNC: 8.5 MG/DL (ref 8.5–10.5)
CHLORIDE SERPL-SCNC: 106 MMOL/L (ref 96–112)
CO2 SERPL-SCNC: 18 MMOL/L (ref 20–33)
CREAT SERPL-MCNC: 0.72 MG/DL (ref 0.5–1.4)
EKG IMPRESSION: NORMAL
EKG IMPRESSION: NORMAL
ERYTHROCYTE [DISTWIDTH] IN BLOOD BY AUTOMATED COUNT: 47 FL (ref 35.9–50)
GLUCOSE BLD-MCNC: 157 MG/DL (ref 65–99)
GLUCOSE BLD-MCNC: 228 MG/DL (ref 65–99)
GLUCOSE BLD-MCNC: 255 MG/DL (ref 65–99)
GLUCOSE BLD-MCNC: 95 MG/DL (ref 65–99)
GLUCOSE SERPL-MCNC: 282 MG/DL (ref 65–99)
HCT VFR BLD AUTO: 41 % (ref 37–47)
HGB BLD-MCNC: 13.9 G/DL (ref 12–16)
MCH RBC QN AUTO: 33.7 PG (ref 27–33)
MCHC RBC AUTO-ENTMCNC: 33.9 G/DL (ref 33.6–35)
MCV RBC AUTO: 99.5 FL (ref 81.4–97.8)
PLATELET # BLD AUTO: 112 K/UL (ref 164–446)
PMV BLD AUTO: 11.1 FL (ref 9–12.9)
POTASSIUM SERPL-SCNC: 3.7 MMOL/L (ref 3.6–5.5)
RBC # BLD AUTO: 4.12 M/UL (ref 4.2–5.4)
SODIUM SERPL-SCNC: 138 MMOL/L (ref 135–145)
WBC # BLD AUTO: 9 K/UL (ref 4.8–10.8)

## 2018-04-29 PROCEDURE — 700101 HCHG RX REV CODE 250: Performed by: HOSPITALIST

## 2018-04-29 PROCEDURE — 770006 HCHG ROOM/CARE - MED/SURG/GYN SEMI*

## 2018-04-29 PROCEDURE — 700102 HCHG RX REV CODE 250 W/ 637 OVERRIDE(OP): Performed by: INTERNAL MEDICINE

## 2018-04-29 PROCEDURE — 99233 SBSQ HOSP IP/OBS HIGH 50: CPT | Performed by: HOSPITALIST

## 2018-04-29 PROCEDURE — 700102 HCHG RX REV CODE 250 W/ 637 OVERRIDE(OP): Performed by: HOSPITALIST

## 2018-04-29 PROCEDURE — 80048 BASIC METABOLIC PNL TOTAL CA: CPT

## 2018-04-29 PROCEDURE — 85027 COMPLETE CBC AUTOMATED: CPT

## 2018-04-29 PROCEDURE — A9270 NON-COVERED ITEM OR SERVICE: HCPCS | Performed by: INTERNAL MEDICINE

## 2018-04-29 PROCEDURE — 94640 AIRWAY INHALATION TREATMENT: CPT

## 2018-04-29 PROCEDURE — A9270 NON-COVERED ITEM OR SERVICE: HCPCS | Performed by: HOSPITALIST

## 2018-04-29 PROCEDURE — 700111 HCHG RX REV CODE 636 W/ 250 OVERRIDE (IP): Performed by: HOSPITALIST

## 2018-04-29 PROCEDURE — 96376 TX/PRO/DX INJ SAME DRUG ADON: CPT

## 2018-04-29 PROCEDURE — 82962 GLUCOSE BLOOD TEST: CPT | Mod: 91

## 2018-04-29 PROCEDURE — 96372 THER/PROPH/DIAG INJ SC/IM: CPT

## 2018-04-29 RX ORDER — TRAZODONE HYDROCHLORIDE 50 MG/1
100 TABLET ORAL ONCE
Status: DISCONTINUED | OUTPATIENT
Start: 2018-04-29 | End: 2018-04-29

## 2018-04-29 RX ORDER — TRAZODONE HYDROCHLORIDE 50 MG/1
100 TABLET ORAL
Status: DISCONTINUED | OUTPATIENT
Start: 2018-04-29 | End: 2018-04-29

## 2018-04-29 RX ORDER — LEVALBUTEROL INHALATION SOLUTION 1.25 MG/3ML
1.25 SOLUTION RESPIRATORY (INHALATION)
Status: DISCONTINUED | OUTPATIENT
Start: 2018-04-29 | End: 2018-05-01 | Stop reason: HOSPADM

## 2018-04-29 RX ADMIN — OXYCODONE HYDROCHLORIDE 10 MG: 10 TABLET ORAL at 20:19

## 2018-04-29 RX ADMIN — NORTRIPTYLINE HYDROCHLORIDE 25 MG: 25 CAPSULE ORAL at 20:15

## 2018-04-29 RX ADMIN — METFORMIN HYDROCHLORIDE 1000 MG: 500 TABLET, EXTENDED RELEASE ORAL at 09:31

## 2018-04-29 RX ADMIN — INSULIN HUMAN 3 UNITS: 100 INJECTION, SOLUTION PARENTERAL at 13:35

## 2018-04-29 RX ADMIN — ATORVASTATIN CALCIUM 40 MG: 40 TABLET, FILM COATED ORAL at 09:27

## 2018-04-29 RX ADMIN — SERTRALINE 100 MG: 100 TABLET, FILM COATED ORAL at 09:29

## 2018-04-29 RX ADMIN — ENOXAPARIN SODIUM 40 MG: 100 INJECTION SUBCUTANEOUS at 09:31

## 2018-04-29 RX ADMIN — INSULIN HUMAN 7 UNITS: 100 INJECTION, SOLUTION PARENTERAL at 20:26

## 2018-04-29 RX ADMIN — NORTRIPTYLINE HYDROCHLORIDE 25 MG: 25 CAPSULE ORAL at 16:49

## 2018-04-29 RX ADMIN — IBUPROFEN 800 MG: 800 TABLET, FILM COATED ORAL at 20:23

## 2018-04-29 RX ADMIN — GABAPENTIN 600 MG: 300 CAPSULE ORAL at 20:14

## 2018-04-29 RX ADMIN — IPRATROPIUM BROMIDE AND ALBUTEROL SULFATE 3 ML: .5; 3 SOLUTION RESPIRATORY (INHALATION) at 14:48

## 2018-04-29 RX ADMIN — IPRATROPIUM BROMIDE AND ALBUTEROL SULFATE 3 ML: .5; 3 SOLUTION RESPIRATORY (INHALATION) at 06:46

## 2018-04-29 RX ADMIN — IPRATROPIUM BROMIDE AND ALBUTEROL SULFATE 3 ML: .5; 3 SOLUTION RESPIRATORY (INHALATION) at 02:11

## 2018-04-29 RX ADMIN — METHYLPREDNISOLONE SODIUM SUCCINATE 40 MG: 40 INJECTION, POWDER, FOR SOLUTION INTRAMUSCULAR; INTRAVENOUS at 17:03

## 2018-04-29 RX ADMIN — GABAPENTIN 600 MG: 300 CAPSULE ORAL at 09:28

## 2018-04-29 RX ADMIN — HYDROXYCHLOROQUINE SULFATE 200 MG: 200 TABLET ORAL at 09:00

## 2018-04-29 RX ADMIN — METHYLPREDNISOLONE SODIUM SUCCINATE 40 MG: 40 INJECTION, POWDER, FOR SOLUTION INTRAMUSCULAR; INTRAVENOUS at 04:48

## 2018-04-29 RX ADMIN — LISINOPRIL 10 MG: 10 TABLET ORAL at 09:31

## 2018-04-29 RX ADMIN — NORTRIPTYLINE HYDROCHLORIDE 25 MG: 25 CAPSULE ORAL at 09:29

## 2018-04-29 RX ADMIN — TRAZODONE HYDROCHLORIDE 100 MG: 50 TABLET ORAL at 21:04

## 2018-04-29 RX ADMIN — DOXYCYCLINE 100 MG: 100 TABLET ORAL at 09:28

## 2018-04-29 RX ADMIN — PILOCARPINE HYDROCHLORIDE 5 MG: 5 TABLET, FILM COATED ORAL at 20:14

## 2018-04-29 RX ADMIN — IPRATROPIUM BROMIDE AND ALBUTEROL SULFATE 3 ML: .5; 3 SOLUTION RESPIRATORY (INHALATION) at 10:10

## 2018-04-29 RX ADMIN — PILOCARPINE HYDROCHLORIDE 5 MG: 5 TABLET, FILM COATED ORAL at 16:49

## 2018-04-29 RX ADMIN — PILOCARPINE HYDROCHLORIDE 5 MG: 5 TABLET, FILM COATED ORAL at 09:29

## 2018-04-29 RX ADMIN — DOXYCYCLINE 100 MG: 100 TABLET ORAL at 20:15

## 2018-04-29 RX ADMIN — ASPIRIN 81 MG: 81 TABLET, CHEWABLE ORAL at 09:27

## 2018-04-29 RX ADMIN — INSULIN HUMAN 4 UNITS: 100 INJECTION, SOLUTION PARENTERAL at 06:38

## 2018-04-29 RX ADMIN — GABAPENTIN 600 MG: 300 CAPSULE ORAL at 16:48

## 2018-04-29 ASSESSMENT — ENCOUNTER SYMPTOMS
NAUSEA: 0
ABDOMINAL PAIN: 0
ORTHOPNEA: 0
PND: 0
DIAPHORESIS: 0
SENSORY CHANGE: 0
FEVER: 0
STRIDOR: 0
DIARRHEA: 0
DOUBLE VISION: 0
WHEEZING: 0
WEIGHT LOSS: 0
BLOOD IN STOOL: 0
SHORTNESS OF BREATH: 0
VOMITING: 0
HEARTBURN: 0
CHILLS: 0
SEIZURES: 0
BLURRED VISION: 0
COUGH: 0
FOCAL WEAKNESS: 0
HEADACHES: 0
PHOTOPHOBIA: 0
PALPITATIONS: 0
BRUISES/BLEEDS EASILY: 0
MYALGIAS: 0
SORE THROAT: 0
HEMOPTYSIS: 0
DIZZINESS: 0

## 2018-04-29 ASSESSMENT — PAIN SCALES - GENERAL
PAINLEVEL_OUTOF10: 4
PAINLEVEL_OUTOF10: 4
PAINLEVEL_OUTOF10: 8

## 2018-04-29 NOTE — PROGRESS NOTES
Charge RN: patient requesting to ambulate to restroom without any oxygen. Patient sating 90% on 3.5L n/c at rest. Educated patient on importance of having oxygen while ambulating; patient continues to refuse. Updated primary RNJens.

## 2018-04-29 NOTE — H&P
CHIEF COMPLAINT:  Shortness of breath.    HISTORY OF PRESENT ILLNESS:  This is a 62-year-old female who has previous   medical history significant for COPD/asthma, type 2 diabetes, hypertension,   lupus maintained on Plaquenil and obstructive sleep apnea.  Patient reports   shortness of breath that began several days ago.  She has had a cough and some   runny nose, but she has not had any fever or chills.  She does have some   seasonal allergies and thought that was what aggravating things.  She was in   fact taking some medications for allergies, but she ran out of those.  She is   not on oxygen at home, she does still smoke daily, however.    Patient came to the emergency room for evaluation and on presentation, was   found to desaturated into the low to mid 80s on room air.  She has since been   treated with steroids inhaled bronchodilators and is currently feeling much   better.  She is now saturating on 2 L nasal cannula in the upper 80s and 90%.    REVIEW OF SYSTEMS:  Positive as noted, otherwise all systems are reviewed and   negative.    PREVIOUS MEDICAL HISTORY:  1.  COPD, non-oxygen dependent.  2.  Type 2 diabetes.  3.  Hypertension.  4.  History of lupus, maintained on Plaquenil and immunosuppressed.  5.  History of obstructive sleep apnea, does not use her CPAP.  6.  History of SVT.    MEDICATIONS:  1.  Aspirin 81 mg daily.  2.  Lipitor 40 mg in the evening.  3.  Flexeril 10 mg daily.  4.  Gabapentin 300 mg t.i.d.  5.  Plaquenil 200 mg daily.  6.  P.r.n. Combivent.  7.  Lisinopril 10 mg daily.  8.  Melatonin 5 mg in the evening.  9.  Metformin  mg 2 tabs in the morning.  10.  MetroGel.  11.  Pamelor 25 mg t.i.d.  12.  Salagen 5 mg t.i.d.  13.  Zoloft 100 mg daily.    ALLERGIES:  No known drug allergies.    SOCIAL HISTORY:  Patient smokes about half pack per day.    SURGICAL HISTORY:  1.  Appendectomy.  2.  Cholecystectomy.  3.  Tonsillectomy.  4.  Laminotomy.  5.  Spinal cord stimulator:  This was  placed on 04/19/2018.    FAMILY HISTORY:  Reviewed, but not relevant to presentation.    PHYSICAL EXAMINATION:  VITAL SIGNS:  In ED, T-max 36.4, heart rate 79, respiratory rate 20, /92   and saturating in the low 90s on 2 L nasal cannula.  GENERAL:  The patient is awake, alert.  She is in no acute distress.  HEENT:  Head is normocephalic and atraumatic.  Mucous membranes are moist.    Sclerae and conjunctivae benign.  NECK:  Trachea is in the midline.  Neck is supple.  There is no JVD or bruits.  RESPIRATORY:  Bilateral expiratory wheezes with prolonged expiratory phase.    Some coarse sounds in the bases.  CARDIAC:  Regular rate and rhythm.  No murmurs, rubs or clicks.  ABDOMEN:  Soft, no guarding, rebound, hepatosplenomegaly, or masses.    Nontender to palpation.  EXTREMITIES:  No clubbing, cyanosis or edema.  Peripheral pulses are   maintained and symmetric.  Calves are nontender to palpation with no palpable   cords.  Capillary refill is normal.  SKIN:  Warm and dry.  No rashes are appreciated.  NEUROLOGIC:  Alert, nonfocal.  PSYCHIATRIC:  Mood and affect appropriate.  Hygiene, neat and clean.    LABORATORY DATA:  White count 5.6, hemoglobin 14.3 and platelet count 99.    Sodium 140, potassium is 3.5, BUN 6 and creatinine 0.47.    IMAGING STUDIES:  Two-view chest x-ray is reviewed showing bibasilar   atelectasis, otherwise unremarkable.    ASSESSMENT AND PLAN:  This is a 62-year-old female with problem list as   follows:  1.  Acute hypoxic respiratory failure:  This would appear to be secondary to   chronic obstructive pulmonary disease exacerbation.  Patient will be treated   with IV steroids, empiric doxycycline, though there is no clear indication of   pneumonia.  She will also be treated with aggressive O2 and respiratory   protocols.  She very well may require oxygen on discharge, but will wait and   see.  2.  History of chronic obstructive pulmonary disease:  Non-oxygen dependent.    Treat as  noted.  3.  Type 2 diabetes:  Follow the patient's blood sugars, continue metformin   and place a sliding scale.  Check A1c.  4.  Hypertension:  Continue outpatient regimen, monitor and titrate.  5.  Immunosuppressed on Plaquenil for lupus.  6.  History of lupus.  Continue Plaquenil.  7.  Obstructive sleep apnea:  Patient has been recommended to use CPAP;   however, she does not do so.  We will use oxygen at bedtime.  8.  History of supraventricular tachycardia:  Watch with beta agonist.  9.  Thrombocytopenia:  Patient's platelet count is 99 today, in 10/2017 it was   115 which appeared to indicate a chronic process.  She will likely need   outpatient followup.       ____________________________________     DO AUGUSTA Marti / LUZ MARIA    DD:  04/28/2018 18:31:24  DT:  04/28/2018 18:56:59    D#:  [id number]  Job#:  [id number]    cc: Aidan Castellanos MD

## 2018-04-29 NOTE — ASSESSMENT & PLAN NOTE
No clear source of infection - started on Doxy shortly after BCX drawn.  Blood cultures -- No growth to date.

## 2018-04-29 NOTE — PROGRESS NOTES
[facility] Hospitalist Progress Note    Date of Service: 2018    Chief Complaint  62 y.o. female admitted 2018 with hypoxemia and shortness of breath.     Interval Problem Update  Still requiring supplemental O2. Improving. No BCx growth to date.    Consultants/Specialty  None    Disposition  None        Review of Systems   Constitutional: Negative for chills, diaphoresis, fever and weight loss.   HENT: Negative for ear pain, sore throat and tinnitus.    Eyes: Negative for blurred vision, double vision and photophobia.   Respiratory: Negative for cough, hemoptysis, shortness of breath, wheezing and stridor.    Cardiovascular: Negative for chest pain, palpitations, orthopnea and PND.   Gastrointestinal: Negative for abdominal pain, blood in stool, diarrhea, heartburn, melena, nausea and vomiting.   Genitourinary: Negative for dysuria, hematuria and urgency.   Musculoskeletal: Negative for joint pain and myalgias.   Neurological: Negative for dizziness, sensory change, focal weakness, seizures and headaches.   Endo/Heme/Allergies: Does not bruise/bleed easily.      Physical Exam  Laboratory/Imaging   Hemodynamics  Temp (24hrs), Av.5 °C (97.7 °F), Min:36.2 °C (97.2 °F), Max:36.7 °C (98.1 °F)   Temperature: 36.7 °C (98.1 °F)  Pulse  Av.7  Min: 79  Max: 127   Blood Pressure: 133/65      Respiratory      Respiration: 20, Pulse Oximetry: 91 %, O2 Daily Delivery Respiratory : Silicone Nasal Cannula     Given By:: Mouthpiece, Work Of Breathing / Effort: Mild;Moderate  RUL Breath Sounds: Diminished, RML Breath Sounds: Diminished, RLL Breath Sounds: Diminished, RADHA Breath Sounds: Diminished, LLL Breath Sounds: Diminished    Fluids  No intake or output data in the 24 hours ending 18 1448    Nutrition  Orders Placed This Encounter   Procedures   • Diet Order     Standing Status:   Standing     Number of Occurrences:   1     Order Specific Question:   Diet:     Answer:   Diabetic [3]     Physical Exam    Constitutional: She is oriented to person, place, and time. She appears well-developed and well-nourished. No distress.   HENT:   Head: Normocephalic and atraumatic.   Eyes: EOM are normal. Pupils are equal, round, and reactive to light.   Neck: Neck supple.   Cardiovascular: Normal rate, regular rhythm and normal heart sounds.    Pulmonary/Chest: Effort normal and breath sounds normal. No respiratory distress. She has no wheezes. She has no rales.   Abdominal: Soft. She exhibits no distension. There is no tenderness.   Neurological: She is alert and oriented to person, place, and time. No cranial nerve deficit.   Skin: Skin is warm and dry.       Recent Labs      04/28/18   1025  04/29/18   0229   WBC  5.6  9.0   RBC  4.33  4.12*   HEMOGLOBIN  14.3  13.9   HEMATOCRIT  42.6  41.0   MCV  98.4*  99.5*   MCH  33.0  33.7*   MCHC  33.6  33.9   RDW  46.1  47.0   PLATELETCT  99*  112*   MPV  10.6  11.1     Recent Labs      04/28/18   1025  04/29/18   0229   SODIUM  140  138   POTASSIUM  3.5*  3.7   CHLORIDE  103  106   CO2  24  18*   GLUCOSE  199*  282*   BUN  6*  15   CREATININE  0.47*  0.72   CALCIUM  8.8  8.5                      Assessment/Plan     * Acute hypoxemic respiratory failure (HCC)   Assessment & Plan    SaO2 returned to >90 w supplemental O2          Acute exacerbation of chronic obstructive pulmonary disease (COPD) ([facility])   Assessment & Plan    Steroids, doxy, NPPB  RT protocol  Cont O2        Paroxysmal SVT (supraventricular tachycardia) ([facility])   Assessment & Plan    Watch closely given Beta agonist therapy  ST on tele  Switched Duoneb to Xopenex        Lupus (systemic lupus erythematosus) ([facility])   Assessment & Plan    No change to medical mngt.          Immunocompromised ([facility])   Assessment & Plan    No clear source of infection - started on Doxy shortly after BCX drawn.  Blood cultures -- No growth to date.        Current smoker- (present on admission)   Assessment & Plan    Counseled on admission re:  tobacco cessation        Hyperlipidemia- (present on admission)   Assessment & Plan    Statin therapy        Thrombocytopenia (HCC)- (present on admission)   Assessment & Plan    Improving - unclear etiology  Follow        Type 2 diabetes mellitus with diabetic polyneuropathy, with long-term current use of insulin (HCC)- (present on admission)   Assessment & Plan    SSI  A1C 6.6  Cont home Metformin            Essential hypertension- (present on admission)   Assessment & Plan    Cont home regimen and adjust PRN          Quality-Core Measures   Reviewed items::  Labs reviewed, EKG reviewed, Medications reviewed and Radiology images reviewed  Nicholson catheter::  No Nicholson  DVT prophylaxis pharmacological::  Enoxaparin (Lovenox)  Ulcer Prophylaxis::  Not indicated

## 2018-04-29 NOTE — PROGRESS NOTES
Assumed care of pt at 0700. Bedside report received from nightshift RN. Initial assessment complete.  Pt WOB mild to moderate; denies SOB on 3.5L NC,  inspiratory and expiratory wheezes to bilateral bases and RML; diminished to bilateral upper lobes. Pt denies N/T or lightheadedness. Pt ST up to 120s on monitor with frequent, multifocal PVCs. POC discussed with pt; no further questions at this time. Bed in the lowest position, call light instruction provided and within reach.

## 2018-04-29 NOTE — CARE PLAN
Problem: Bronchoconstriction:  Goal: Improve in air movement and diminished wheezing  Outcome: PROGRESSING AS EXPECTED    Intervention: Implement inhaled treatments  Duoneb QID

## 2018-04-29 NOTE — PROGRESS NOTES
Called Dr Baltazar to advise of pt's heart rate of 124.  Advised to just monitor pt and gave order for telemetry.  Pt is asymptomatic.  Will monitor.

## 2018-04-29 NOTE — ASSESSMENT & PLAN NOTE
Secondary to smoking. Influenza screen is negative Not on oxygen at home.  Steroids, doxycycline  RT protocol  Weaning off O2 as tolerated  Might need home O2 eval tomorrow

## 2018-04-30 ENCOUNTER — APPOINTMENT (OUTPATIENT)
Dept: RADIOLOGY | Facility: MEDICAL CENTER | Age: 63
DRG: 189 | End: 2018-04-30
Attending: INTERNAL MEDICINE
Payer: MEDICARE

## 2018-04-30 PROBLEM — D72.829 LEUKOCYTOSIS: Status: ACTIVE | Noted: 2018-04-30

## 2018-04-30 LAB
ANION GAP SERPL CALC-SCNC: 8 MMOL/L (ref 0–11.9)
BASOPHILS # BLD AUTO: 0.1 % (ref 0–1.8)
BASOPHILS # BLD: 0.02 K/UL (ref 0–0.12)
BUN SERPL-MCNC: 25 MG/DL (ref 8–22)
CALCIUM SERPL-MCNC: 9.1 MG/DL (ref 8.5–10.5)
CHLORIDE SERPL-SCNC: 107 MMOL/L (ref 96–112)
CO2 SERPL-SCNC: 24 MMOL/L (ref 20–33)
CREAT SERPL-MCNC: 0.65 MG/DL (ref 0.5–1.4)
EOSINOPHIL # BLD AUTO: 0 K/UL (ref 0–0.51)
EOSINOPHIL NFR BLD: 0 % (ref 0–6.9)
ERYTHROCYTE [DISTWIDTH] IN BLOOD BY AUTOMATED COUNT: 45.9 FL (ref 35.9–50)
FLUAV+FLUBV AG SPEC QL IA: NORMAL
GLUCOSE BLD-MCNC: 207 MG/DL (ref 65–99)
GLUCOSE BLD-MCNC: 210 MG/DL (ref 65–99)
GLUCOSE BLD-MCNC: 218 MG/DL (ref 65–99)
GLUCOSE BLD-MCNC: 219 MG/DL (ref 65–99)
GLUCOSE SERPL-MCNC: 200 MG/DL (ref 65–99)
HCT VFR BLD AUTO: 41.4 % (ref 37–47)
HGB BLD-MCNC: 14.1 G/DL (ref 12–16)
IMM GRANULOCYTES # BLD AUTO: 0.11 K/UL (ref 0–0.11)
IMM GRANULOCYTES NFR BLD AUTO: 0.7 % (ref 0–0.9)
LYMPHOCYTES # BLD AUTO: 1.57 K/UL (ref 1–4.8)
LYMPHOCYTES NFR BLD: 9.8 % (ref 22–41)
MAGNESIUM SERPL-MCNC: 1.8 MG/DL (ref 1.5–2.5)
MCH RBC QN AUTO: 33.7 PG (ref 27–33)
MCHC RBC AUTO-ENTMCNC: 34.1 G/DL (ref 33.6–35)
MCV RBC AUTO: 99 FL (ref 81.4–97.8)
MONOCYTES # BLD AUTO: 0.35 K/UL (ref 0–0.85)
MONOCYTES NFR BLD AUTO: 2.2 % (ref 0–13.4)
NEUTROPHILS # BLD AUTO: 13.91 K/UL (ref 2–7.15)
NEUTROPHILS NFR BLD: 87.2 % (ref 44–72)
NRBC # BLD AUTO: 0 K/UL
NRBC BLD-RTO: 0 /100 WBC
PLATELET # BLD AUTO: 119 K/UL (ref 164–446)
PMV BLD AUTO: 11.2 FL (ref 9–12.9)
POTASSIUM SERPL-SCNC: 4.5 MMOL/L (ref 3.6–5.5)
RBC # BLD AUTO: 4.18 M/UL (ref 4.2–5.4)
SIGNIFICANT IND 70042: NORMAL
SITE SITE: NORMAL
SODIUM SERPL-SCNC: 139 MMOL/L (ref 135–145)
SOURCE SOURCE: NORMAL
WBC # BLD AUTO: 16 K/UL (ref 4.8–10.8)

## 2018-04-30 PROCEDURE — 700101 HCHG RX REV CODE 250: Performed by: NURSE PRACTITIONER

## 2018-04-30 PROCEDURE — 94640 AIRWAY INHALATION TREATMENT: CPT

## 2018-04-30 PROCEDURE — 36415 COLL VENOUS BLD VENIPUNCTURE: CPT

## 2018-04-30 PROCEDURE — 99233 SBSQ HOSP IP/OBS HIGH 50: CPT | Performed by: INTERNAL MEDICINE

## 2018-04-30 PROCEDURE — 87400 INFLUENZA A/B EACH AG IA: CPT

## 2018-04-30 PROCEDURE — 85025 COMPLETE CBC W/AUTO DIFF WBC: CPT

## 2018-04-30 PROCEDURE — 71045 X-RAY EXAM CHEST 1 VIEW: CPT

## 2018-04-30 PROCEDURE — 700111 HCHG RX REV CODE 636 W/ 250 OVERRIDE (IP): Performed by: HOSPITALIST

## 2018-04-30 PROCEDURE — 83735 ASSAY OF MAGNESIUM: CPT

## 2018-04-30 PROCEDURE — A9270 NON-COVERED ITEM OR SERVICE: HCPCS | Performed by: HOSPITALIST

## 2018-04-30 PROCEDURE — 700102 HCHG RX REV CODE 250 W/ 637 OVERRIDE(OP): Performed by: HOSPITALIST

## 2018-04-30 PROCEDURE — 770006 HCHG ROOM/CARE - MED/SURG/GYN SEMI*

## 2018-04-30 PROCEDURE — 80048 BASIC METABOLIC PNL TOTAL CA: CPT

## 2018-04-30 PROCEDURE — 82962 GLUCOSE BLOOD TEST: CPT | Mod: 91

## 2018-04-30 RX ORDER — NICOTINE 21 MG/24HR
14 PATCH, TRANSDERMAL 24 HOURS TRANSDERMAL
Status: DISCONTINUED | OUTPATIENT
Start: 2018-04-30 | End: 2018-05-01 | Stop reason: HOSPADM

## 2018-04-30 RX ADMIN — CYCLOBENZAPRINE 10 MG: 10 TABLET, FILM COATED ORAL at 21:00

## 2018-04-30 RX ADMIN — INSULIN HUMAN 4 UNITS: 100 INJECTION, SOLUTION PARENTERAL at 11:56

## 2018-04-30 RX ADMIN — METHYLPREDNISOLONE SODIUM SUCCINATE 40 MG: 40 INJECTION, POWDER, FOR SOLUTION INTRAMUSCULAR; INTRAVENOUS at 00:43

## 2018-04-30 RX ADMIN — OXYCODONE HYDROCHLORIDE 10 MG: 10 TABLET ORAL at 21:00

## 2018-04-30 RX ADMIN — OXYCODONE HYDROCHLORIDE 10 MG: 10 TABLET ORAL at 16:20

## 2018-04-30 RX ADMIN — PILOCARPINE HYDROCHLORIDE 5 MG: 5 TABLET, FILM COATED ORAL at 22:05

## 2018-04-30 RX ADMIN — LEVALBUTEROL HYDROCHLORIDE 1.25 MG: 1.25 SOLUTION RESPIRATORY (INHALATION) at 04:34

## 2018-04-30 RX ADMIN — METHYLPREDNISOLONE SODIUM SUCCINATE 40 MG: 40 INJECTION, POWDER, FOR SOLUTION INTRAMUSCULAR; INTRAVENOUS at 20:45

## 2018-04-30 RX ADMIN — METHYLPREDNISOLONE SODIUM SUCCINATE 40 MG: 40 INJECTION, POWDER, FOR SOLUTION INTRAMUSCULAR; INTRAVENOUS at 05:33

## 2018-04-30 RX ADMIN — HYDROXYCHLOROQUINE SULFATE 200 MG: 200 TABLET ORAL at 09:00

## 2018-04-30 RX ADMIN — GABAPENTIN 600 MG: 300 CAPSULE ORAL at 08:25

## 2018-04-30 RX ADMIN — DOXYCYCLINE 100 MG: 100 TABLET ORAL at 20:44

## 2018-04-30 RX ADMIN — NORTRIPTYLINE HYDROCHLORIDE 25 MG: 25 CAPSULE ORAL at 16:20

## 2018-04-30 RX ADMIN — PILOCARPINE HYDROCHLORIDE 5 MG: 5 TABLET, FILM COATED ORAL at 10:56

## 2018-04-30 RX ADMIN — PILOCARPINE HYDROCHLORIDE 5 MG: 5 TABLET, FILM COATED ORAL at 17:52

## 2018-04-30 RX ADMIN — STANDARDIZED SENNA CONCENTRATE AND DOCUSATE SODIUM 2 TABLET: 8.6; 5 TABLET, FILM COATED ORAL at 08:25

## 2018-04-30 RX ADMIN — INSULIN HUMAN 4 UNITS: 100 INJECTION, SOLUTION PARENTERAL at 08:26

## 2018-04-30 RX ADMIN — ATORVASTATIN CALCIUM 40 MG: 40 TABLET, FILM COATED ORAL at 08:25

## 2018-04-30 RX ADMIN — DOXYCYCLINE 100 MG: 100 TABLET ORAL at 08:25

## 2018-04-30 RX ADMIN — LISINOPRIL 10 MG: 10 TABLET ORAL at 08:25

## 2018-04-30 RX ADMIN — NORTRIPTYLINE HYDROCHLORIDE 25 MG: 25 CAPSULE ORAL at 20:46

## 2018-04-30 RX ADMIN — SERTRALINE 100 MG: 100 TABLET, FILM COATED ORAL at 08:25

## 2018-04-30 RX ADMIN — METFORMIN HYDROCHLORIDE 1000 MG: 500 TABLET, EXTENDED RELEASE ORAL at 10:56

## 2018-04-30 RX ADMIN — ENOXAPARIN SODIUM 40 MG: 100 INJECTION SUBCUTANEOUS at 08:25

## 2018-04-30 RX ADMIN — GABAPENTIN 600 MG: 300 CAPSULE ORAL at 16:19

## 2018-04-30 RX ADMIN — METHYLPREDNISOLONE SODIUM SUCCINATE 40 MG: 40 INJECTION, POWDER, FOR SOLUTION INTRAMUSCULAR; INTRAVENOUS at 11:48

## 2018-04-30 RX ADMIN — METHYLPREDNISOLONE SODIUM SUCCINATE 40 MG: 40 INJECTION, POWDER, FOR SOLUTION INTRAMUSCULAR; INTRAVENOUS at 16:19

## 2018-04-30 RX ADMIN — ASPIRIN 81 MG: 81 TABLET, CHEWABLE ORAL at 08:25

## 2018-04-30 RX ADMIN — GABAPENTIN 600 MG: 300 CAPSULE ORAL at 20:45

## 2018-04-30 RX ADMIN — NORTRIPTYLINE HYDROCHLORIDE 25 MG: 25 CAPSULE ORAL at 10:57

## 2018-04-30 RX ADMIN — INSULIN HUMAN 4 UNITS: 100 INJECTION, SOLUTION PARENTERAL at 20:52

## 2018-04-30 RX ADMIN — INSULIN HUMAN 4 UNITS: 100 INJECTION, SOLUTION PARENTERAL at 18:11

## 2018-04-30 RX ADMIN — ACETAMINOPHEN 650 MG: 325 TABLET, FILM COATED ORAL at 16:20

## 2018-04-30 ASSESSMENT — ENCOUNTER SYMPTOMS
COUGH: 1
SHORTNESS OF BREATH: 1
CONSTITUTIONAL NEGATIVE: 1
DIZZINESS: 0
POLYDIPSIA: 0
BLURRED VISION: 0
MUSCULOSKELETAL NEGATIVE: 1
HEADACHES: 0
NECK PAIN: 0
CARDIOVASCULAR NEGATIVE: 1
WHEEZING: 0
CHILLS: 0
TREMORS: 0
CONSTIPATION: 0
HEMOPTYSIS: 0
MYALGIAS: 0
EYES NEGATIVE: 1
HEARTBURN: 0
DOUBLE VISION: 0
VOMITING: 0
NEUROLOGICAL NEGATIVE: 1
TINGLING: 0
SPUTUM PRODUCTION: 1
WEIGHT LOSS: 0
DIARRHEA: 0
BRUISES/BLEEDS EASILY: 0
FEVER: 0
PSYCHIATRIC NEGATIVE: 1
GASTROINTESTINAL NEGATIVE: 1
ORTHOPNEA: 0
PHOTOPHOBIA: 0
PALPITATIONS: 0
NAUSEA: 0

## 2018-04-30 ASSESSMENT — PAIN SCALES - GENERAL
PAINLEVEL_OUTOF10: 0
PAINLEVEL_OUTOF10: 6
PAINLEVEL_OUTOF10: 3
PAINLEVEL_OUTOF10: 8
PAINLEVEL_OUTOF10: 4
PAINLEVEL_OUTOF10: 9
PAINLEVEL_OUTOF10: 4

## 2018-04-30 NOTE — PROGRESS NOTES
Pt asking med for sleeping, per pt, benadryl and ambien dont work,on call paged, spoke to Dr. Baltazar,new orders received

## 2018-04-30 NOTE — DISCHARGE PLANNING
Updated Clinical note: via chart review  Pt is currently on 3 liters of O2-not baseline.  Pt needs to be weaned O2.     note:  Rounding done.   Pt said she lives with her dad and sister.   She is independent pta.   She has no home O2. She owns a walker and a wheelchair but does not use it.     Discussed with IDT :  MD indicated possible dc tomorrow if pt stops wheezing.     Addendum:  @9790  IMM letter given. Explained to pt, pt signed, copy to pt and to chart.  Pt said she wants to go home already because her 92 year old dad is home alone and her sister has to work.   I talked to Dr. Root and the plan is to dc pt tomorrow possibly with home O2. MD agreed to place an order for home O2 study.

## 2018-04-30 NOTE — PROGRESS NOTES
Pt asking if she can be DC by noon.  Pt is still on IV solumedrol and has expiratory wheezes present.  Pt on 3 LPM, and has no O2 at home.  Discussed these barriers to DC with patient.  Pt understanding, but still  Anxious to leave.  MD Root made aware.

## 2018-04-30 NOTE — PROGRESS NOTES
[facility] Hospitalist Progress Note    Date of Service: 2018    Chief Complaint  62 y.o. Female with hx of  COPD/asthma, type 2 diabetes, hypertension,   lupus, smoking, SVT, maintained on Plaquenil and obstructive sleep apnea admitted 2018 with  complaints of persistent dyspnea, hypoxemia, cough    Interval Problem Update  Diffuse bilateral wheezing on expiration. On 3 L of oxygen (not on oxygen at baseline)  I explained her that she is not ready to go home.  She might need O2 to go home with, consider home O2 evaluation tomorrow.  Nicotine replacement protocol ordered    Consultants/Specialty  None    Disposition  Home when medically cleared        Review of Systems   Constitutional: Negative.  Negative for chills, fever and weight loss.   HENT: Negative.  Negative for ear discharge, ear pain, hearing loss and tinnitus.    Eyes: Negative.  Negative for blurred vision, double vision and photophobia.   Respiratory: Positive for cough, sputum production and shortness of breath. Negative for hemoptysis and wheezing.    Cardiovascular: Negative.  Negative for chest pain, palpitations, orthopnea and leg swelling.   Gastrointestinal: Negative.  Negative for constipation, diarrhea, heartburn, nausea and vomiting.   Genitourinary: Negative.  Negative for dysuria, frequency and urgency.   Musculoskeletal: Negative.  Negative for myalgias and neck pain.   Skin: Negative.    Neurological: Negative.  Negative for dizziness, tingling, tremors and headaches.   Endo/Heme/Allergies: Negative.  Negative for environmental allergies and polydipsia. Does not bruise/bleed easily.   Psychiatric/Behavioral: Negative.    All other systems reviewed and are negative.        Physical Exam  Laboratory/Imaging   Hemodynamics  Temp (24hrs), Av.6 °C (97.8 °F), Min:36.2 °C (97.2 °F), Max:36.9 °C (98.4 °F)   Temperature: 36.4 °C (97.6 °F)  Pulse  Av.5  Min: 79  Max: 127 Heart Rate (Monitored): (!) 123  Blood Pressure: 139/65       Respiratory      Respiration: 20, Pulse Oximetry: 96 %, O2 Daily Delivery Respiratory : Silicone Nasal Cannula     Given By:: Mouthpiece, Work Of Breathing / Effort: Mild;Moderate  RUL Breath Sounds: Expiratory Wheezes, RML Breath Sounds: Expiratory Wheezes, RLL Breath Sounds: Expiratory Wheezes, RADHA Breath Sounds: Expiratory Wheezes, LLL Breath Sounds: Expiratory Wheezes    Fluids  No intake or output data in the 24 hours ending 04/30/18 1415    Nutrition  Orders Placed This Encounter   Procedures   • Diet Order     Standing Status:   Standing     Number of Occurrences:   1     Order Specific Question:   Diet:     Answer:   Diabetic [3]     Physical Exam   On 3 L oxygen nasal cannula  Constitutional: She is oriented to person, place, and time. She appears well-developed and well-nourished. No distress.   HENT:   Head: Normocephalic and atraumatic.   Eyes: EOM are normal. Pupils are equal, round, and reactive to light.   Neck: Neck supple.   Cardiovascular: Normal rate, regular rhythm and normal heart sounds.    Pulmonary/Chest: Diffuse expiratory wheezes. She has no rales.   Abdominal: Soft. She exhibits no distension. There is no tenderness.   Neurological: She is alert and oriented to person, place, and time. No cranial nerve deficit.   Skin: Skin is warm and dry.     Recent Labs      04/28/18   1025  04/29/18   0229  04/30/18 0223   WBC  5.6  9.0  16.0*   RBC  4.33  4.12*  4.18*   HEMOGLOBIN  14.3  13.9  14.1   HEMATOCRIT  42.6  41.0  41.4   MCV  98.4*  99.5*  99.0*   MCH  33.0  33.7*  33.7*   MCHC  33.6  33.9  34.1   RDW  46.1  47.0  45.9   PLATELETCT  99*  112*  119*   MPV  10.6  11.1  11.2     Recent Labs      04/28/18   1025  04/29/18   0229  04/30/18 0223   SODIUM  140  138  139   POTASSIUM  3.5*  3.7  4.5   CHLORIDE  103  106  107   CO2  24  18*  24   GLUCOSE  199*  282*  200*   BUN  6*  15  25*   CREATININE  0.47*  0.72  0.65   CALCIUM  8.8  8.5  9.1                      Assessment/Plan     *  Acute hypoxemic respiratory failure (HCC)   Assessment & Plan    Wean off O2 as tolerated.might  Need to be on oxygen after discharge        Acute exacerbation of chronic obstructive pulmonary disease (COPD) ([facility])   Assessment & Plan    Secondary to smoking. Influenza screen is negative Not on oxygen at home.  Steroids, doxycycline  RT protocol  Weaning off O2 as tolerated  Might need home O2 eval tomorrow        Leukocytosis   Assessment & Plan    Probably secondary to steroids  CXR: Mild interstitial prominence.  Influenza screen negative  UA pending  Afebrile  Continue to monitor          Paroxysmal SVT (supraventricular tachycardia) ([facility])   Assessment & Plan    History of  Switched Duoneb to Xopenex        Lupus (systemic lupus erythematosus) ([facility])   Assessment & Plan    Continued plaquenil          Immunocompromised ([facility])   Assessment & Plan    No clear source of infection - started on Doxy shortly after BCX drawn.  Blood cultures -- No growth to date.        Current smoker- (present on admission)   Assessment & Plan    Counseled on admission re: tobacco cessation.        Hyperlipidemia- (present on admission)   Assessment & Plan    Statin to continue        Thrombocytopenia ([facility])- (present on admission)   Assessment & Plan    Improving - unclear etiology  Follow        Type 2 diabetes mellitus with diabetic polyneuropathy, with long-term current use of insulin ([facility])- (present on admission)   Assessment & Plan    SSI  A1C 6.6  Cont home Metformin            Essential hypertension- (present on admission)   Assessment & Plan    Cont home regimen  Monitor          Quality-Core Measures

## 2018-04-30 NOTE — PROGRESS NOTES
Assumed care of pt. Report received from CDU nurse via phone. Welcomed pt to floor, No SOB, or in any acute distress. On 3.5 L per oxy mask for pt's comfort. IS at bedside provided pt with welcome packet. One blue bag of pt belongings with pt placed on side chair. Fall precautions in place,  refusing bed alarm. - Treaded non slip socks. Call light and pt belongings within reach - pt makes needs known - hourly rounding in place. See flowsheets for further assessment.

## 2018-04-30 NOTE — PROGRESS NOTES
Transported pt to S524-2 with 3.5L of oxygen via wheelchair with transport, all belongings and charts with patient

## 2018-04-30 NOTE — PROGRESS NOTES
2 RN skin assessment completed with myself and charge RN. Some bruising noted on BUE. No open wounds or sores noted.

## 2018-04-30 NOTE — ASSESSMENT & PLAN NOTE
Probably secondary to steroids  CXR: Mild interstitial prominence.  Influenza screen negative  UA pending  Afebrile  Continue to monitor

## 2018-04-30 NOTE — CARE PLAN
Problem: Safety  Goal: Will remain free from injury  Call light with in reach, hourly rounding in place. Pt refusing strip bed alarm at this time. Instructed pt to use call light prior to getting OOB. Pt verbalizing understanding.     Problem: Discharge Barriers/Planning  Goal: Patient's continuum of care needs will be met    Intervention: Assess potential discharge barriers on admission and throughout hospital stay  POC discussed with pt. Instructed pt on importance of IS and pulmonary toileting.

## 2018-04-30 NOTE — PROGRESS NOTES
A/o,respirations are even and unlabored on 3.5L,assessment completed, vital signs stable,ST on the monitor, pt complaints of generalized pain, aggravated with movement, medicated per MAR, updated communication board,  poc discussed and understood, verbalized understanding, box meal given, all questions answered at this time , fall precautions in place, call button within reach, will continue to monitor

## 2018-05-01 VITALS
WEIGHT: 201.72 LBS | HEART RATE: 93 BPM | BODY MASS INDEX: 35.74 KG/M2 | RESPIRATION RATE: 20 BRPM | TEMPERATURE: 97.8 F | OXYGEN SATURATION: 90 % | HEIGHT: 63 IN | DIASTOLIC BLOOD PRESSURE: 80 MMHG | SYSTOLIC BLOOD PRESSURE: 141 MMHG

## 2018-05-01 LAB
ANION GAP SERPL CALC-SCNC: 9 MMOL/L (ref 0–11.9)
BASOPHILS # BLD AUTO: 0.1 % (ref 0–1.8)
BASOPHILS # BLD: 0.02 K/UL (ref 0–0.12)
BUN SERPL-MCNC: 23 MG/DL (ref 8–22)
CALCIUM SERPL-MCNC: 8.8 MG/DL (ref 8.5–10.5)
CHLORIDE SERPL-SCNC: 101 MMOL/L (ref 96–112)
CO2 SERPL-SCNC: 27 MMOL/L (ref 20–33)
CREAT SERPL-MCNC: 0.57 MG/DL (ref 0.5–1.4)
EOSINOPHIL # BLD AUTO: 0 K/UL (ref 0–0.51)
EOSINOPHIL NFR BLD: 0 % (ref 0–6.9)
ERYTHROCYTE [DISTWIDTH] IN BLOOD BY AUTOMATED COUNT: 45.6 FL (ref 35.9–50)
GLUCOSE BLD-MCNC: 185 MG/DL (ref 65–99)
GLUCOSE BLD-MCNC: 235 MG/DL (ref 65–99)
GLUCOSE SERPL-MCNC: 198 MG/DL (ref 65–99)
HCT VFR BLD AUTO: 43.1 % (ref 37–47)
HGB BLD-MCNC: 14.4 G/DL (ref 12–16)
IMM GRANULOCYTES # BLD AUTO: 0.13 K/UL (ref 0–0.11)
IMM GRANULOCYTES NFR BLD AUTO: 0.9 % (ref 0–0.9)
LYMPHOCYTES # BLD AUTO: 1.84 K/UL (ref 1–4.8)
LYMPHOCYTES NFR BLD: 13.3 % (ref 22–41)
MCH RBC QN AUTO: 33 PG (ref 27–33)
MCHC RBC AUTO-ENTMCNC: 33.4 G/DL (ref 33.6–35)
MCV RBC AUTO: 98.6 FL (ref 81.4–97.8)
MONOCYTES # BLD AUTO: 0.36 K/UL (ref 0–0.85)
MONOCYTES NFR BLD AUTO: 2.6 % (ref 0–13.4)
NEUTROPHILS # BLD AUTO: 11.51 K/UL (ref 2–7.15)
NEUTROPHILS NFR BLD: 83.1 % (ref 44–72)
NRBC # BLD AUTO: 0 K/UL
NRBC BLD-RTO: 0 /100 WBC
PLATELET # BLD AUTO: 118 K/UL (ref 164–446)
PMV BLD AUTO: 10.8 FL (ref 9–12.9)
POTASSIUM SERPL-SCNC: 4.2 MMOL/L (ref 3.6–5.5)
RBC # BLD AUTO: 4.37 M/UL (ref 4.2–5.4)
SODIUM SERPL-SCNC: 137 MMOL/L (ref 135–145)
T4 FREE SERPL-MCNC: 0.69 NG/DL (ref 0.53–1.43)
TSH SERPL DL<=0.005 MIU/L-ACNC: 0.09 UIU/ML (ref 0.38–5.33)
WBC # BLD AUTO: 13.9 K/UL (ref 4.8–10.8)

## 2018-05-01 PROCEDURE — 84439 ASSAY OF FREE THYROXINE: CPT

## 2018-05-01 PROCEDURE — G8989 SELF CARE D/C STATUS: HCPCS | Mod: CI

## 2018-05-01 PROCEDURE — 85025 COMPLETE CBC W/AUTO DIFF WBC: CPT

## 2018-05-01 PROCEDURE — 84443 ASSAY THYROID STIM HORMONE: CPT

## 2018-05-01 PROCEDURE — 82962 GLUCOSE BLOOD TEST: CPT

## 2018-05-01 PROCEDURE — 97165 OT EVAL LOW COMPLEX 30 MIN: CPT

## 2018-05-01 PROCEDURE — 700102 HCHG RX REV CODE 250 W/ 637 OVERRIDE(OP): Performed by: HOSPITALIST

## 2018-05-01 PROCEDURE — 700111 HCHG RX REV CODE 636 W/ 250 OVERRIDE (IP): Performed by: HOSPITALIST

## 2018-05-01 PROCEDURE — 700102 HCHG RX REV CODE 250 W/ 637 OVERRIDE(OP): Performed by: INTERNAL MEDICINE

## 2018-05-01 PROCEDURE — G8988 SELF CARE GOAL STATUS: HCPCS | Mod: CI

## 2018-05-01 PROCEDURE — A9270 NON-COVERED ITEM OR SERVICE: HCPCS | Performed by: INTERNAL MEDICINE

## 2018-05-01 PROCEDURE — 36415 COLL VENOUS BLD VENIPUNCTURE: CPT

## 2018-05-01 PROCEDURE — G8987 SELF CARE CURRENT STATUS: HCPCS | Mod: CI

## 2018-05-01 PROCEDURE — 80048 BASIC METABOLIC PNL TOTAL CA: CPT

## 2018-05-01 PROCEDURE — 99239 HOSP IP/OBS DSCHRG MGMT >30: CPT | Performed by: HOSPITALIST

## 2018-05-01 PROCEDURE — A9270 NON-COVERED ITEM OR SERVICE: HCPCS | Performed by: HOSPITALIST

## 2018-05-01 RX ORDER — PREDNISONE 20 MG/1
TABLET ORAL
Qty: 18 TAB | Refills: 0 | Status: SHIPPED | OUTPATIENT
Start: 2018-05-01 | End: 2018-07-06

## 2018-05-01 RX ORDER — TIOTROPIUM BROMIDE 18 UG/1
18 CAPSULE ORAL; RESPIRATORY (INHALATION) DAILY
Qty: 30 CAP | Refills: 3 | Status: SHIPPED | OUTPATIENT
Start: 2018-05-01 | End: 2018-07-06

## 2018-05-01 RX ORDER — DOXYCYCLINE 100 MG/1
100 CAPSULE ORAL 2 TIMES DAILY
Qty: 14 CAP | Refills: 0 | Status: SHIPPED | OUTPATIENT
Start: 2018-05-01 | End: 2018-05-08

## 2018-05-01 RX ORDER — BUDESONIDE AND FORMOTEROL FUMARATE DIHYDRATE 160; 4.5 UG/1; UG/1
2 AEROSOL RESPIRATORY (INHALATION) 2 TIMES DAILY
Qty: 1 INHALER | Refills: 3 | Status: SHIPPED | OUTPATIENT
Start: 2018-05-01 | End: 2018-07-06 | Stop reason: SDUPTHER

## 2018-05-01 RX ORDER — NICOTINE 21 MG/24HR
1 PATCH, TRANSDERMAL 24 HOURS TRANSDERMAL EVERY 24 HOURS
Qty: 30 PATCH | Refills: 3 | Status: SHIPPED | OUTPATIENT
Start: 2018-05-01 | End: 2019-10-02

## 2018-05-01 RX ADMIN — NICOTINE 14 MG: 14 PATCH, EXTENDED RELEASE TRANSDERMAL at 05:21

## 2018-05-01 RX ADMIN — PILOCARPINE HYDROCHLORIDE 5 MG: 5 TABLET, FILM COATED ORAL at 09:05

## 2018-05-01 RX ADMIN — METFORMIN HYDROCHLORIDE 1000 MG: 500 TABLET, EXTENDED RELEASE ORAL at 09:05

## 2018-05-01 RX ADMIN — ENOXAPARIN SODIUM 40 MG: 100 INJECTION SUBCUTANEOUS at 09:06

## 2018-05-01 RX ADMIN — NORTRIPTYLINE HYDROCHLORIDE 25 MG: 25 CAPSULE ORAL at 09:04

## 2018-05-01 RX ADMIN — SERTRALINE 100 MG: 100 TABLET, FILM COATED ORAL at 09:05

## 2018-05-01 RX ADMIN — INSULIN HUMAN 3 UNITS: 100 INJECTION, SOLUTION PARENTERAL at 12:11

## 2018-05-01 RX ADMIN — INSULIN HUMAN 4 UNITS: 100 INJECTION, SOLUTION PARENTERAL at 09:10

## 2018-05-01 RX ADMIN — HYDROXYCHLOROQUINE SULFATE 200 MG: 200 TABLET ORAL at 09:00

## 2018-05-01 RX ADMIN — METHYLPREDNISOLONE SODIUM SUCCINATE 40 MG: 40 INJECTION, POWDER, FOR SOLUTION INTRAMUSCULAR; INTRAVENOUS at 05:22

## 2018-05-01 RX ADMIN — OXYCODONE HYDROCHLORIDE 10 MG: 10 TABLET ORAL at 06:38

## 2018-05-01 RX ADMIN — LISINOPRIL 10 MG: 10 TABLET ORAL at 09:06

## 2018-05-01 RX ADMIN — ATORVASTATIN CALCIUM 40 MG: 40 TABLET, FILM COATED ORAL at 09:05

## 2018-05-01 RX ADMIN — GABAPENTIN 600 MG: 300 CAPSULE ORAL at 09:05

## 2018-05-01 RX ADMIN — METHYLPREDNISOLONE SODIUM SUCCINATE 40 MG: 40 INJECTION, POWDER, FOR SOLUTION INTRAMUSCULAR; INTRAVENOUS at 09:06

## 2018-05-01 RX ADMIN — ASPIRIN 81 MG: 81 TABLET, CHEWABLE ORAL at 09:05

## 2018-05-01 RX ADMIN — OXYCODONE HYDROCHLORIDE 10 MG: 10 TABLET ORAL at 10:10

## 2018-05-01 RX ADMIN — DOXYCYCLINE 100 MG: 100 TABLET ORAL at 11:04

## 2018-05-01 ASSESSMENT — PAIN SCALES - GENERAL
PAINLEVEL_OUTOF10: 5
PAINLEVEL_OUTOF10: 8
PAINLEVEL_OUTOF10: 8
PAINLEVEL_OUTOF10: 3

## 2018-05-01 ASSESSMENT — ACTIVITIES OF DAILY LIVING (ADL): TOILETING: INDEPENDENT

## 2018-05-01 ASSESSMENT — COGNITIVE AND FUNCTIONAL STATUS - GENERAL
DAILY ACTIVITIY SCORE: 24
SUGGESTED CMS G CODE MODIFIER DAILY ACTIVITY: CH

## 2018-05-01 NOTE — FACE TO FACE
Face to Face Note  -  Durable Medical Equipment    Valeri Dickens M.D. - NPI: [id number]  I certify that this patient is under my care and that they had a durable medical equipment(DME)face to face encounter by myself that meets the physician DME face-to-face encounter requirements with this patient on:    Date of encounter:   Patient:                    MRN:                       YOB: 2018  Marisa Zayas  [id number]  1955     The encounter with the patient was in whole, or in part, for the following medical condition, which is the primary reason for durable medical equipment:  COPD    I certify that, based on my findings, the following durable medical equipment is medically necessary:  Oxygen.    HOME O2 Saturation Measurements:(Values must be present for Home Oxygen orders)  Room air sat at rest: 90  Room air sat with amb: 86  With liters of O2: 2.5, O2 sat at rest with O2: 92  With Liters of O2: 3, O2 sat with amb with O2 : 90  Is the patient mobile?: Yes    My Clinical findings support the need for the above equipment due to:  Hypoxia    Supporting Symptoms: desaturation with ambulation, SOB and hypoxia.

## 2018-05-01 NOTE — DISCHARGE SUMMARY
CHIEF COMPLAINT ON ADMISSION  Chief Complaint   Patient presents with   • Shortness of Breath       CODE STATUS  Full Code    HPI & HOSPITAL COURSE  This is a 62 y.o. female admitted 4/28/18 with hx of  COPD/asthma, type 2 diabetes, hypertension,   Lupus on plaquenil, mixed connective tissue disorder on pilocarpine, smoking and obstructive sleep apnea admitted 4/28/2018 with  complaints of persistent dyspnea, hypoxemia, cough.  She required 3 LPM NC for persistent hypoxia at time of discharge.  No history of prior home O2 use.  Her CXR on review of images did reveal extensive interstitial prominence.  She continued to have expiratory rhonchi at dc, but was adamant to leave in order to care for her father in the ER after a car accident.  Home O2 was ordered and delivered to patient, prednisone long taper since on 40mg IV q 6 hours and to finish 10 days total of doxycycline.  She was started on nicotine replacement and told of the damage in her lungs seen on CXR.  She will need to see a pulmonologist as an outpatient given the fact that she is a smoker with COPD, lupus and mixed connective tissue disease.  She has never seen one in the past.  Spiriva and symbicort also prescribed at discharge.  Leukocytosis secondary to steroid effect.  Ambulating well, eating well at discharge.    The patient met 2-midnight criteria for an inpatient stay at the time of discharge.    Therefore, she is discharged in good and stable condition with close outpatient follow-up.    SPECIFIC OUTPATIENT FOLLOW-UP  Follow up PCP in 1 week for recheck and pulmonology referral.    DISCHARGE PROBLEM LIST  Principal Problem:    Acute hypoxemic respiratory failure (HCC) POA: Yes  Active Problems:    Acute exacerbation of chronic obstructive pulmonary disease (COPD) (HCC) POA: Yes    Essential hypertension POA: Yes    Undifferentiated connective tissue disease (HCC) POA: Yes    Type 2 diabetes mellitus with diabetic polyneuropathy, with long-term  current use of insulin (HCC) POA: Yes    Thrombocytopenia (HCC) POA: Yes    Hyperlipidemia POA: Yes    Current smoker POA: Yes    Immunocompromised (HCC) POA: Yes    Lupus (systemic lupus erythematosus) (HCC) POA: Yes    Paroxysmal SVT (supraventricular tachycardia) (HCC) POA: Yes  Resolved Problems:    * No resolved hospital problems. *      FOLLOW UP  Future Appointments  Date Time Provider Department Center   5/3/2018 1:40 PM [facility]ARE MANAGER [facility]   5/4/2018 1:40 PM JEANNIE Seth [facility]     No follow-up provider specified.    MEDICATIONS ON DISCHARGE   Marisa Zayas   Home Medication Instructions [place]ARDEN[place]:[id number]    Printed on:05/01/18 6791   Medication Information                      aspirin (ASA) 81 MG Chew Tab chewable tablet  Take 1 Tab by mouth every day.             atorvastatin (LIPITOR) 20 MG Tab  Take 2 Tabs by mouth every day. TAKE 2 TABLETS BY MOUTH DAILY             budesonide-formoterol (SYMBICORT) 160-4.5 MCG/ACT Aerosol  Inhale 2 Puffs by mouth 2 Times a Day.             cyclobenzaprine (FLEXERIL) 10 MG Tab  Take 10 mg by mouth 2 times a day as needed for Muscle Spasms.             doxycycline (MONODOX) 100 MG capsule  Take 1 Cap by mouth 2 times a day for 7 days.             gabapentin (NEURONTIN) 300 MG Cap  Take 600 mg by mouth 3 times a day.             hydroxychloroquine (PLAQUENIL) 200 MG Tab  Take 1 tablet by mouth daily             ipratropium-albuterol (COMBIVENT RESPIMAT)  MCG/ACT Aero Soln  Inhale 1 Puff by mouth 4 times a day.             lisinopril (PRINIVIL) 10 MG Tab  Take 1 Tab by mouth every day.             Melatonin 3-10 MG Tab  Take 5 mg by mouth every bedtime.             metformin ER (GLUCOPHAGE XR) 500 MG TABLET SR 24 HR  Take 2 Tabs by mouth every day.             metronidazole (METROGEL) 1 % gel  Apply 1 Application to affected area(s) 2 times a day.             nicotine (NICODERM) 14 MG/24HR PATCH 24 HR  Apply 1 Patch to skin as directed every 24  hours.             nicotine polacrilex (NICORETTE) 2 MG Gum  Take 1 Each by mouth every 1 hour as needed for Smoking Cessation (For nicotine urge).             nortriptyline (PAMELOR) 25 MG Cap  Take 1 Cap by mouth 3 times a day.             pilocarpine (SALAGEN) 5 MG Tab  Take 1 Tab by mouth 3 times a day for 180 days.             predniSONE (DELTASONE) 20 MG Tab  Take 3 tablets po daily for 3 days, then 2 tablets po for 3 days, then 1 tablet po daily for 3 days.             sertraline (ZOLOFT) 100 MG Tab  Take 1 Tab by mouth every day.             tiotropium (SPIRIVA) 18 MCG Cap  Inhale 1 Cap by mouth every day.                 DIET  Orders Placed This Encounter   Procedures   • Diet Order     Standing Status:   Standing     Number of Occurrences:   1     Order Specific Question:   Diet:     Answer:   Diabetic [3]       ACTIVITY  As tolerated.  Weight bearing as tolerated      CONSULTATIONS  none    PROCEDURES  CXR    Mild interstitial prominence.   Reading Provider Reading Date   Arnie Newman M.D. Apr 30, 2018       LABORATORY  Lab Results   Component Value Date/Time    SODIUM 137 05/01/2018 03:03 AM    POTASSIUM 4.2 05/01/2018 03:03 AM    CHLORIDE 101 05/01/2018 03:03 AM    CO2 27 05/01/2018 03:03 AM    GLUCOSE 198 (H) 05/01/2018 03:03 AM    BUN 23 (H) 05/01/2018 03:03 AM    CREATININE 0.57 05/01/2018 03:03 AM        Lab Results   Component Value Date/Time    WBC 13.9 (H) 05/01/2018 03:03 AM    HEMOGLOBIN 14.4 05/01/2018 03:03 AM    HEMATOCRIT 43.1 05/01/2018 03:03 AM    PLATELETCT 118 (L) 05/01/2018 03:03 AM        Total time of the discharge process exceeds 45 minutes

## 2018-05-01 NOTE — DISCHARGE INSTRUCTIONS
Discharge Instructions    Discharged to home by car with relative. Discharged via wheelchair, hospital escort: Refused.  Special equipment needed: Oxygen    Be sure to schedule a follow-up appointment with your primary care doctor or any specialists as instructed.     Discharge Plan:   Diet Plan: Discussed  Activity Level: Discussed  Smoking Cessation Offered: Patient Refused  Confirmed Follow up Appointment: Appointment Scheduled  Confirmed Symptoms Management: Discussed  Medication Reconciliation Updated: Yes  Influenza Vaccine Indication: Not indicated: Previously immunized this influenza season and > 8 years of age    I understand that a diet low in cholesterol, fat, and sodium is recommended for good health. Unless I have been given specific instructions below for another diet, I accept this instruction as my diet prescription.   Other diet: Diabetic Diet    Special Instructions: None    · Is patient discharged on Warfarin / Coumadin?   No     Depression / Suicide Risk    As you are discharged from this [facility] facility, it is important to learn how to keep safe from harming yourself.    Recognize the warning signs:  · Abrupt changes in personality, positive or negative- including increase in energy   · Giving away possessions  · Change in eating patterns- significant weight changes-  positive or negative  · Change in sleeping patterns- unable to sleep or sleeping all the time   · Unwillingness or inability to communicate  · Depression  · Unusual sadness, discouragement and loneliness  · Talk of wanting to die  · Neglect of personal appearance   · Rebelliousness- reckless behavior  · Withdrawal from people/activities they love  · Confusion- inability to concentrate     If you or a loved one observes any of these behaviors or has concerns about self-harm, here's what you can do:  · Talk about it- your feelings and reasons for harming yourself  · Remove any means that you might use to hurt yourself  (examples: pills, rope, extension cords, firearm)  · Get professional help from the community (Mental Health, Substance Abuse, psychological counseling)  · Do not be alone:Call your Safe Contact- someone whom you trust who will be there for you.  · Call your local CRISIS HOTLINE 303-0758 or 184-978-7580  · Call your local Children's Mobile Crisis Response Team [place] (711) 933-7686 or www.[employer]  · Call the toll free National Suicide Prevention Hotlines   · National Suicide Prevention Lifeline 753-030-NPUH (1289)  · [employer] Hope Line Network 800-SUICIDE (125-3514)      Chronic Obstructive Pulmonary Disease  (COPD)    Use all your inhalers as prescribed and return if you need albuterol more often than every 4 hours.  Take prednisone and antibiotics as prescribed. Return also for ill appearance or worsening shortness of breath.  See your doctor if not better in 2 days. Stop all tobacco use..    You had an elevated or high normal blood pressure reading today.  This does not necessarily mean you have hypertension.  Please followup with your/a primary physician for comprehensive blood pressure evaluation.  BP Readings from Last 3 Encounters:   04/28/18 136/92   04/19/18 119/69   03/23/18 116/80   .    Your physician has diagnosed you as having chronic obstructive pulmonary disease (COPD). This is a process in which the air flow is restricted from leaving the lungs. It is also an end stage process of previous damage with the most common cause being smoking. It is essentially irreversible and treatment is mainly supportive. The lungs can never return to normal, but there are measures you can take which will improve them and make you feel better.    HOME CARE INSTRUCTIONS  Ø If you smoke, STOP SMOKING. The carbon monoxide build-up in the blood robs you of your already short oxygen supply.  Ø If antibiotics were prescribed, take as directed.  Ø Avoid antihistamines and cough syrups. (They dry your system up  and slow down the elimination of secretions. This decreases respiratory capacity and may lead to infections.)  Ø Drink 8 large glasses of fluids per day. (This loosens secretions.)  Ø Use humidifiers in home and at bedside if they do not make breathing difficult.  Ø Receive all protective vaccines your caregiver suggests, especially pneumococcal and influenza.  Ø Use home oxygen as suggested once started.    seek medical attention if:  Ø Sputum becomes purulent (infected looking).  Ø An oral temperature that lasts 2 or more days above 102° F (38.9° C) or as your caregiver suggests, and is not controlled by medication.  Ø Breathing is more labored or exercise tolerance is decreasing.  Ø You are running out of medicine you take for your breathing.    seek immediate medical care if:  Ø You have rapid heart rate, agitation, confusion or stupor (family members may need to observe this), or tremors.  Ø Any time it becomes difficult to breathe.  Ø You develop chest pain.    Document Released: 09/27/2006  Document Re-Released: 10/04/2007  [employer]® Patient Information ©2008 [employer].

## 2018-05-01 NOTE — THERAPY
"Occupational Therapy Evaluation completed.   Functional Status:  Supervised/Independent with ADLs and txfs  Plan of Care: Patient with no further skilled OT needs in the acute care setting at this time  Discharge Recommendations:   Post-acute therapy Currently anticipate no further skilled therapy needs once patient is discharged from the inpatient setting.    See \"Rehab Therapy-Acute\" Patient Summary Report for complete documentation.    "

## 2018-05-01 NOTE — DISCHARGE PLANNING
Updated Clinical note: via chart review  Home O2 sat test indicated 86% on room air.  Required 3 liter to increase O2 sat .     Update Discharge Planning:  Pt signed choice for [facility]. Faxed to [facility].   Dr. Dickens aware that we will need home O2 order.    Addendum:  Noted order for home O2 has been sent to [facility]    Addendum:  Checked in room but O2 has not been delivered yet so I asked [facility] to follow up with [facility].

## 2018-05-01 NOTE — CARE PLAN
Problem: Infection  Goal: Will remain free from infection  Outcome: PROGRESSING AS EXPECTED  Patient educated on infection prevention measures; Sani-Hands provided.     Problem: Venous Thromboembolism (VTW)/Deep Vein Thrombosis (DVT) Prevention:  Goal: Patient will participate in Venous Thrombosis (VTE)/Deep Vein Thrombosis (DVT)Prevention Measures  Outcome: PROGRESSING AS EXPECTED  Patient receiving Lovenox for DVT prophylaxis; administered as per MAR.

## 2018-05-01 NOTE — CARE PLAN
Problem: Safety  Goal: Will remain free from falls    Intervention: Assess risk factors for falls  HD risk assessment complete.  Interventions in place.  Pt refuses use of Bed alarm- Charge aware.       Problem: Discharge Barriers/Planning  Goal: Patient's continuum of care needs will be met    Intervention: Collaborate with Transitional Care Team and Interdisciplinary Team to meet discharge needs  Home O2 eval complete.  Pt to need O2 to [facility] aware, to set up on 5/1 a.m.

## 2018-05-01 NOTE — DISCHARGE PLANNING
Spoke with [facility],they have accepted patient on service. Per [facility]la they will have equipment bedside in about 1 hour

## 2018-05-01 NOTE — PROGRESS NOTES
Pt has been given discharge paperwork and prescriptions.  Pt verbalized understanding of paperwork and of additions/changes to medication regimen.  Pt PIV d/c'd, tip intact.  Pt leaving via car with sister to home.

## 2018-05-01 NOTE — PROGRESS NOTES
Received bedside report from NOC RN. Assumed care at 0700. Patient resting comfortably in bed, no s/s of distress at this time. Patient able to make needs known and denies any at this time. Bed alarm refused, fall precautions implemented. Hourly rounding in place.

## 2018-05-01 NOTE — DISCHARGE PLANNING
Received choice form from [facility].  Referral sent to [facility] at 0841 on 05-01-18

## 2018-05-03 ENCOUNTER — PATIENT OUTREACH (OUTPATIENT)
Dept: HEALTH INFORMATION MANAGEMENT | Facility: OTHER | Age: 63
End: 2018-05-03

## 2018-05-03 LAB
BACTERIA BLD CULT: NORMAL
BACTERIA BLD CULT: NORMAL
SIGNIFICANT IND 70042: NORMAL
SIGNIFICANT IND 70042: NORMAL
SITE SITE: NORMAL
SITE SITE: NORMAL
SOURCE SOURCE: NORMAL
SOURCE SOURCE: NORMAL

## 2018-05-04 ENCOUNTER — OFFICE VISIT (OUTPATIENT)
Dept: MEDICAL GROUP | Facility: CLINIC | Age: 63
End: 2018-05-04
Payer: MEDICARE

## 2018-05-04 VITALS
OXYGEN SATURATION: 97 % | TEMPERATURE: 96.5 F | BODY MASS INDEX: 35.08 KG/M2 | HEART RATE: 96 BPM | RESPIRATION RATE: 16 BRPM | DIASTOLIC BLOOD PRESSURE: 64 MMHG | WEIGHT: 198 LBS | HEIGHT: 63 IN | SYSTOLIC BLOOD PRESSURE: 118 MMHG

## 2018-05-04 DIAGNOSIS — J44.1 ACUTE EXACERBATION OF CHRONIC OBSTRUCTIVE PULMONARY DISEASE (COPD) (HCC): ICD-10-CM

## 2018-05-04 DIAGNOSIS — D84.9 IMMUNOCOMPROMISED (HCC): ICD-10-CM

## 2018-05-04 DIAGNOSIS — E05.90 SUBCLINICAL HYPERTHYROIDISM: ICD-10-CM

## 2018-05-04 DIAGNOSIS — Z09 HOSPITAL DISCHARGE FOLLOW-UP: ICD-10-CM

## 2018-05-04 DIAGNOSIS — Z13.6 SCREENING FOR CARDIOVASCULAR CONDITION: ICD-10-CM

## 2018-05-04 DIAGNOSIS — J96.01 ACUTE HYPOXEMIC RESPIRATORY FAILURE (HCC): ICD-10-CM

## 2018-05-04 PROCEDURE — 99495 TRANSJ CARE MGMT MOD F2F 14D: CPT | Performed by: NURSE PRACTITIONER

## 2018-05-04 ASSESSMENT — ENCOUNTER SYMPTOMS
FEVER: 0
CHILLS: 0
FOCAL WEAKNESS: 0
HEADACHES: 0
DIZZINESS: 0
COUGH: 1
WEAKNESS: 0
NERVOUS/ANXIOUS: 0
DEPRESSION: 0
MYALGIAS: 0
SPUTUM PRODUCTION: 0
SHORTNESS OF BREATH: 1
ABDOMINAL PAIN: 0
FALLS: 0
BACK PAIN: 1
PALPITATIONS: 0
WHEEZING: 0
VOMITING: 0
HEARTBURN: 0
NAUSEA: 0

## 2018-05-04 NOTE — PROGRESS NOTES
Subjective:     Marisa Zayas is a 62 y.o. female who presents for Hospital Follow-up.  Chart reviewed. Discharge summary available for review: Yes   Date of discharge 5/1/2018.  48- hour post discharge RN call completed on 5/3/2018 and documented in the medical record by Olya Pisano.    HPI: Recently hospitalized for sob, underlying COPD, TORO, lupus on plaquenil, found to have hypoxia, treated for COPD exacerbation, discharged with oxygen 3 lpm, tapering steroid, symbicort and spiriva.     Active smoker. No PFT in the past.   4/30 cxr Mild interstitial prominence.  4/28 A1C 6.8 (10/2017 A1C 8.2). On metformin  5/1 TSH 0.09, FT4 0.69  4/28 EKG sinus tachycardia     Since returning home, patient reports feeling much better. She was not sure why she needs to take so many inhaler because she thought they are all the same, she thought there was a mistake. After explanation for each different mechanism, she verbalized understanding. She is now using oxygen 3 lpm at home and ready to joana down since she has felt better. Some residual coughing still but overall has gone better. She has no new symptoms since discharge. She has no smoked since discharge, on nicotine patch.     She reports hx of Asthma but has not had any attack for years. She has never heard of COPD. She never had PFT. She has hx of TORO in the past in [place] but that was 10 years ago. Her machine was taken back by [facility]. She is not sure about her status. She agrees with PFT and Pulmonologist referral.     The patient denied weakness; no difficulty taking care of self at home. Sister is also helping her out and taking her to appointments  Patient reports taking medications as instructed.     NO PCP follow up appointment.    Patient Active Problem List    Diagnosis Date Noted   • Acute hypoxemic respiratory failure (HCC) 04/29/2018     Priority: High   • Acute exacerbation of chronic obstructive pulmonary disease (COPD) (HCC) 04/29/2018      "Priority: Medium   • Essential hypertension 10/30/2017     Priority: Low   • Immunocompromised ([facility]) 04/29/2018   • Lupus (systemic lupus erythematosus) ([facility]) 04/29/2018   • Paroxysmal SVT (supraventricular tachycardia) ([facility]) 04/29/2018   • Obesity (BMI 30-39.9) 10/30/2017   • Undifferentiated connective tissue disease ([facility]) 10/30/2017   • Type 2 diabetes mellitus with diabetic polyneuropathy, with long-term current use of insulin ([facility]) 10/30/2017   • Thrombocytopenia ([facility]) 01/28/2016   • Chronic low back pain 04/19/2014   • Current smoker 04/19/2014   • Hyperlipidemia 02/24/2009         Allergies:   Patient has no known allergies.    Social History:  Social History   Substance Use Topics   • Smoking status: Current Every Day Smoker     Packs/day: 0.50     Years: 20.00   • Smokeless tobacco: Never Used      Comment: 1 ppd since teenager   • Alcohol use No        ROS:  Review of Systems   Constitutional: Negative for chills, fever and malaise/fatigue.   Respiratory: Positive for cough ( still having some but gone better) and shortness of breath (on exertion). Negative for sputum production and wheezing.    Cardiovascular: Negative for chest pain, palpitations and leg swelling.   Gastrointestinal: Negative for abdominal pain, heartburn, nausea and vomiting.   Genitourinary: Negative for dysuria, frequency and urgency.   Musculoskeletal: Positive for back pain (chronic, seeing surgeon on Thursday). Negative for falls and myalgias.   Skin: Negative for rash.   Neurological: Negative for dizziness, focal weakness, weakness and headaches.   Psychiatric/Behavioral: Negative for depression. The patient is not nervous/anxious.         Objective:     Blood pressure 118/64, pulse 96, temperature 35.8 °C (96.5 °F), resp. rate 16, height 1.6 m (5' 3\"), weight 89.8 kg (198 lb), SpO2 97 %.     Physical Exam:  Physical Exam   Constitutional: She is oriented to person, place, and time and well-developed, well-nourished, and in no " distress.   HENT:   Head: Normocephalic and atraumatic.   Eyes: Conjunctivae are normal.   Neck: Neck supple. No JVD present. No thyromegaly present.   Cardiovascular: Regular rhythm.    Pulmonary/Chest: Effort normal and breath sounds normal. No respiratory distress. She has no wheezes.   Still somewhat diminished to right side but overall clear   Abdominal: Soft. Bowel sounds are normal. She exhibits no distension. There is no tenderness.   Musculoskeletal: Normal range of motion. She exhibits no edema.   Neurological: She is alert and oriented to person, place, and time.   Skin: Skin is warm. No erythema.   Nursing note and vitals reviewed.        Assessment and Plan:     1. Hospital discharge follow-up  Hospitalization and results reviewed with patient. High risk conditions requiring teaching or care coordination were identified and addressed.The patient demonstrate understanding of admission and underlying conditions. The patient understands discharge instructions and when to seek medical attention. Medications reviewed including instructions regarding high risk medications, dosing and side effects.    The patient is able to safely adhere to ADL/IADL, treatment and medication regimen, self-manage of high-risk conditions? Yes   The patient requires physical therapy/home health/DME referral? No   The patient requires referral to care coordination/behavioral health/social work?  Yes   Patient requires referral for pharmacy consult? No   Advance directive/POLST on file?  No   Required counseled on advance directive?  No     Scheduled to see PCP on 5/31    2. Acute exacerbation of chronic obstructive pulmonary disease (COPD) (HCC)  Continue oxygen, pt is comfort to titrate down on her own at home and will call us if she has any questions.   Continue prednisone and doxycycline to complete the course. Continue symbicort, spiriva, combivent as needed.   - PULMONARY FUNCTION TESTS Test requested: Complete Pulmonary  Function Test  - REFERRAL TO PULMONOLOGY    3. Immunocompromised (HCC)  - PULMONARY FUNCTION TESTS Test requested: Complete Pulmonary Function Test  - REFERRAL TO PULMONOLOGY    4. Acute hypoxemic respiratory failure (HCC)  - PULMONARY FUNCTION TESTS Test requested: Complete Pulmonary Function Test  - REFERRAL TO PULMONOLOGY    5. Subclinical hyperthyroidism  - TSH+FREE T4  - TRIIODOTHYRONINE (T3)    Sinus tachycardia. Pt reports this is her baseline. No chest pain or palpitation. Pt is instructed to track and monitor her HR/BP for few weeks. Bring log to PCP on 5/31 or call us before that if persistent HR > 100. Will consider BB.     Well score: 0, sob has improved, less likely PE at this time    6. Screening for cardiovascular condition  Continue lipitor. Last  ON 10/14/2017  DM on metformin. Also taking aspirin 81  BP controlled with Lisinopril.   - LIPID PROFILE; Future      Medication Reconciliation  Medication list at end of encounter:   Current Outpatient Prescriptions   Medication Sig Dispense Refill   • doxycycline (MONODOX) 100 MG capsule Take 1 Cap by mouth 2 times a day for 7 days. 14 Cap 0   • nicotine (NICODERM) 14 MG/24HR PATCH 24 HR Apply 1 Patch to skin as directed every 24 hours. 30 Patch 3   • predniSONE (DELTASONE) 20 MG Tab Take 3 tablets po daily for 3 days, then 2 tablets po for 3 days, then 1 tablet po daily for 3 days. 18 Tab 0   • budesonide-formoterol (SYMBICORT) 160-4.5 MCG/ACT Aerosol Inhale 2 Puffs by mouth 2 Times a Day. 1 Inhaler 3   • tiotropium (SPIRIVA) 18 MCG Cap Inhale 1 Cap by mouth every day. 30 Cap 3   • gabapentin (NEURONTIN) 300 MG Cap Take 600 mg by mouth 3 times a day.     • aspirin (ASA) 81 MG Chew Tab chewable tablet Take 1 Tab by mouth every day. 100 Tab 6   • metformin ER (GLUCOPHAGE XR) 500 MG TABLET SR 24 HR Take 2 Tabs by mouth every day. 60 Tab 3   • lisinopril (PRINIVIL) 10 MG Tab Take 1 Tab by mouth every day. 30 Tab 3   • atorvastatin (LIPITOR) 20 MG Tab  Take 2 Tabs by mouth every day. TAKE 2 TABLETS BY MOUTH DAILY 30 Tab 3   • nortriptyline (PAMELOR) 25 MG Cap Take 1 Cap by mouth 3 times a day. 30 Cap 2   • hydroxychloroquine (PLAQUENIL) 200 MG Tab Take 1 tablet by mouth daily     • ipratropium-albuterol (COMBIVENT RESPIMAT)  MCG/ACT Aero Soln Inhale 1 Puff by mouth 4 times a day.     • metronidazole (METROGEL) 1 % gel Apply 1 Application to affected area(s) 2 times a day.     • cyclobenzaprine (FLEXERIL) 10 MG Tab Take 10 mg by mouth 2 times a day as needed for Muscle Spasms.     • pilocarpine (SALAGEN) 5 MG Tab Take 1 Tab by mouth 3 times a day for 180 days. 90 Tab 5   • Melatonin 3-10 MG Tab Take 5 mg by mouth every bedtime. 30 Tab 6   • sertraline (ZOLOFT) 100 MG Tab Take 1 Tab by mouth every day. 30 Tab 6     No current facility-administered medications for this visit.        Primary care follow-up:  New health conditions identified during hospitalization? Yes   Labs/pathology/imaging requires future PCP follow-up?  Yes   Changes to medications during hospitalization or today? Yes during hospitalization    Recommended followup: No Follow-up on file. with Aidan Castellanos M.D.   Future Appointments       Provider Department Center    5/4/2018 1:40 PM JEANNIE Seth [facility]          Patient Instruction  Patient offered educational material on discharge diagnosis and management of symptoms/red flags. Patient instructed to keep follow-up appointments and to bring written questions and and actual medications to each office visit. Patient instructed to call PCP/specialist with any problems/questions/concerns. Patient verbalizes understanding and has no further questions at this time.    Face-to-face transitional care management services with moderate complexity medical decision making.

## 2018-05-04 NOTE — PATIENT INSTRUCTIONS
If you need further assistance, or have any questions; concerns or lingering symptoms before seeing your Primary Care Provider or specialist.     Do not hesitate to contact us.     Please contact us at the Post-Hospital Follow Up Program at [phone].   Our offices hours are Monday-Friday 8 am-5 pm.

## 2018-05-04 NOTE — PROGRESS NOTES
POST DISCHARGE CALL MADE BY Olya Pisano  Discharge Date:5/1/2018   Date of Outreach Call: 5/3/2018  1:55 PM  Now that you're home, how are you doing? Fair  Comment:Patient reports she is feeling okay. Reports she  is wearing oxygen at 3 liters.  Do you have questions about your medications? Yes  Comment:Patient questioning if she needs to use all of  the inhalers that were prescribed.  CM reviewed current  medication list.  Encouraged pt to bring medications and  inhalers to appt for review    Did you fill your medications? Yes    Do you have a follow-up appointment scheduled?Yes  Comment:Post discharge clinic on 5/4/18. Reports no  transportation issues.    Discharging Department: [place] [id number]    Number of Attempts: 1  Current or previous attempts completed within two business days of discharge? Yes  Provided education regarding treatment plan, medication, self-management, ADLs? Yes  Comment:Patient reports home care was not ordered at  discharge  Has patient completed Advance Directive? If yes, advise them to bring to appointment. No  Care Manager phone number provided? Yes  Is there anything else I can help you with? No

## 2018-07-05 ENCOUNTER — HOSPITAL ENCOUNTER (OUTPATIENT)
Dept: RADIOLOGY | Facility: MEDICAL CENTER | Age: 63
End: 2018-07-05
Attending: NURSE PRACTITIONER
Payer: MEDICARE

## 2018-07-05 DIAGNOSIS — M96.1 POSTLAMINECTOMY SYNDROME, CERVICAL REGION: ICD-10-CM

## 2018-07-05 PROCEDURE — 72110 X-RAY EXAM L-2 SPINE 4/>VWS: CPT

## 2018-07-06 ENCOUNTER — OFFICE VISIT (OUTPATIENT)
Dept: INTERNAL MEDICINE | Facility: MEDICAL CENTER | Age: 63
End: 2018-07-06
Payer: MEDICARE

## 2018-07-06 VITALS
TEMPERATURE: 97.7 F | HEIGHT: 63 IN | SYSTOLIC BLOOD PRESSURE: 137 MMHG | DIASTOLIC BLOOD PRESSURE: 73 MMHG | OXYGEN SATURATION: 96 % | WEIGHT: 202 LBS | HEART RATE: 98 BPM | BODY MASS INDEX: 35.79 KG/M2

## 2018-07-06 DIAGNOSIS — Z12.11 SCREENING FOR COLON CANCER: ICD-10-CM

## 2018-07-06 DIAGNOSIS — Z79.4 TYPE 2 DIABETES MELLITUS WITH DIABETIC POLYNEUROPATHY, WITH LONG-TERM CURRENT USE OF INSULIN (HCC): ICD-10-CM

## 2018-07-06 DIAGNOSIS — E66.9 OBESITY (BMI 35.0-39.9 WITHOUT COMORBIDITY): ICD-10-CM

## 2018-07-06 DIAGNOSIS — Z72.0 TOBACCO ABUSE: ICD-10-CM

## 2018-07-06 DIAGNOSIS — Z79.899 LONG-TERM USE OF HYDROXYCHLOROQUINE: ICD-10-CM

## 2018-07-06 DIAGNOSIS — M79.7 FIBROMYALGIA: ICD-10-CM

## 2018-07-06 DIAGNOSIS — Z12.39 SCREENING FOR BREAST CANCER: ICD-10-CM

## 2018-07-06 DIAGNOSIS — F17.200 CURRENT SMOKER: ICD-10-CM

## 2018-07-06 DIAGNOSIS — R68.2 DRY MOUTH: ICD-10-CM

## 2018-07-06 DIAGNOSIS — E11.42 TYPE 2 DIABETES MELLITUS WITH DIABETIC POLYNEUROPATHY, WITH LONG-TERM CURRENT USE OF INSULIN (HCC): ICD-10-CM

## 2018-07-06 DIAGNOSIS — M32.19 OTHER SYSTEMIC LUPUS ERYTHEMATOSUS WITH OTHER ORGAN INVOLVEMENT (HCC): ICD-10-CM

## 2018-07-06 DIAGNOSIS — F33.41 RECURRENT MAJOR DEPRESSIVE DISORDER, IN PARTIAL REMISSION (HCC): ICD-10-CM

## 2018-07-06 DIAGNOSIS — I10 ESSENTIAL HYPERTENSION: ICD-10-CM

## 2018-07-06 DIAGNOSIS — G47.33 OSA (OBSTRUCTIVE SLEEP APNEA): ICD-10-CM

## 2018-07-06 DIAGNOSIS — D69.6 THROMBOCYTOPENIA (HCC): ICD-10-CM

## 2018-07-06 PROBLEM — J96.01 ACUTE HYPOXEMIC RESPIRATORY FAILURE (HCC): Status: RESOLVED | Noted: 2018-04-29 | Resolved: 2018-07-06

## 2018-07-06 PROBLEM — J44.1 ACUTE EXACERBATION OF CHRONIC OBSTRUCTIVE PULMONARY DISEASE (COPD) (HCC): Status: RESOLVED | Noted: 2018-04-29 | Resolved: 2018-07-06

## 2018-07-06 PROBLEM — J98.4 RESTRICTIVE LUNG DISEASE: Status: ACTIVE | Noted: 2017-07-12

## 2018-07-06 LAB
HBA1C MFR BLD: 8.9 % (ref ?–5.8)
INT CON NEG: NORMAL
INT CON POS: NORMAL

## 2018-07-06 PROCEDURE — 99213 OFFICE O/P EST LOW 20 MIN: CPT | Mod: GE | Performed by: INTERNAL MEDICINE

## 2018-07-06 PROCEDURE — 83036 HEMOGLOBIN GLYCOSYLATED A1C: CPT | Mod: GC | Performed by: INTERNAL MEDICINE

## 2018-07-06 RX ORDER — CYCLOBENZAPRINE HCL 10 MG
10 TABLET ORAL 2 TIMES DAILY PRN
Qty: 30 TAB | Refills: 2 | Status: SHIPPED | OUTPATIENT
Start: 2018-07-06

## 2018-07-06 RX ORDER — ATORVASTATIN CALCIUM 40 MG/1
40 TABLET, FILM COATED ORAL DAILY
Qty: 30 TAB | Refills: 6 | Status: SHIPPED | OUTPATIENT
Start: 2018-07-06 | End: 2018-08-10 | Stop reason: SDUPTHER

## 2018-07-06 RX ORDER — ATORVASTATIN CALCIUM 20 MG/1
40 TABLET, FILM COATED ORAL DAILY
Qty: 30 TAB | Refills: 6 | Status: CANCELLED | OUTPATIENT
Start: 2018-07-06

## 2018-07-06 RX ORDER — HYDROXYCHLOROQUINE SULFATE 200 MG/1
TABLET, FILM COATED ORAL
Qty: 30 TAB | Refills: 0 | Status: SHIPPED | OUTPATIENT
Start: 2018-07-06 | End: 2018-08-10 | Stop reason: SDUPTHER

## 2018-07-06 RX ORDER — BUPROPION HYDROCHLORIDE 150 MG/1
150 TABLET, EXTENDED RELEASE ORAL 2 TIMES DAILY
Qty: 60 TAB | Refills: 1 | Status: SHIPPED | OUTPATIENT
Start: 2018-07-06 | End: 2018-08-10

## 2018-07-06 RX ORDER — BUDESONIDE AND FORMOTEROL FUMARATE DIHYDRATE 160; 4.5 UG/1; UG/1
2 AEROSOL RESPIRATORY (INHALATION) 2 TIMES DAILY
Qty: 1 INHALER | Refills: 3 | Status: SHIPPED | OUTPATIENT
Start: 2018-07-06 | End: 2019-10-24 | Stop reason: SDUPTHER

## 2018-07-06 RX ORDER — CETIRIZINE HYDROCHLORIDE 10 MG/1
10 TABLET ORAL DAILY
Qty: 30 TAB | Refills: 6 | Status: SHIPPED | OUTPATIENT
Start: 2018-07-06

## 2018-07-06 RX ORDER — LISINOPRIL 10 MG/1
10 TABLET ORAL DAILY
Qty: 30 TAB | Refills: 6 | Status: SHIPPED | OUTPATIENT
Start: 2018-07-06 | End: 2019-10-24 | Stop reason: SDUPTHER

## 2018-07-06 RX ORDER — PILOCARPINE HYDROCHLORIDE 5 MG/1
5 TABLET, FILM COATED ORAL 3 TIMES DAILY
Qty: 90 TAB | Refills: 5 | Status: SHIPPED | OUTPATIENT
Start: 2018-07-06 | End: 2019-01-02

## 2018-07-06 RX ORDER — CETIRIZINE HYDROCHLORIDE 10 MG/1
10 TABLET ORAL DAILY
COMMUNITY
End: 2018-07-06 | Stop reason: SDUPTHER

## 2018-07-06 RX ORDER — ASPIRIN 81 MG/1
81 TABLET, CHEWABLE ORAL DAILY
Qty: 100 TAB | Refills: 6 | Status: ON HOLD | OUTPATIENT
Start: 2018-07-06 | End: 2021-05-03

## 2018-07-06 RX ORDER — SERTRALINE HYDROCHLORIDE 100 MG/1
100 TABLET, FILM COATED ORAL DAILY
Qty: 30 TAB | Refills: 6 | Status: ON HOLD | OUTPATIENT
Start: 2018-07-06 | End: 2021-05-03

## 2018-07-06 RX ORDER — METFORMIN HYDROCHLORIDE 500 MG/1
1000 TABLET, EXTENDED RELEASE ORAL DAILY
Qty: 60 TAB | Refills: 3 | Status: SHIPPED | OUTPATIENT
Start: 2018-07-06 | End: 2019-10-02

## 2018-07-06 ASSESSMENT — ENCOUNTER SYMPTOMS
ABDOMINAL PAIN: 0
WHEEZING: 0
EYE PAIN: 0
HEADACHES: 0
SEIZURES: 0
SHORTNESS OF BREATH: 0
PHOTOPHOBIA: 0
CHILLS: 0
SPUTUM PRODUCTION: 0
EYE REDNESS: 0
VOMITING: 0
COUGH: 0
DEPRESSION: 0
DOUBLE VISION: 0
LOSS OF CONSCIOUSNESS: 0
NAUSEA: 0
HEMOPTYSIS: 0
BACK PAIN: 1
DIZZINESS: 0
FEVER: 0
BLOOD IN STOOL: 1
FALLS: 0
BLURRED VISION: 0
PALPITATIONS: 0

## 2018-07-06 ASSESSMENT — PAIN SCALES - GENERAL: PAINLEVEL: 8=MODERATE-SEVERE PAIN

## 2018-07-06 ASSESSMENT — LIFESTYLE VARIABLES: SUBSTANCE_ABUSE: 0

## 2018-07-06 NOTE — PROGRESS NOTES
Established Patient    Marisa presents today with the following:    CC: follow up for DM, smoking, UCTD     HPI:     Marisa Zayas is a 61 yo F with PMHx of undifferentiated connective tissue disease (unknown if truly SLE, on Plaquenil), type II DM with neuropathy, HTN, and fibromyalgia who presents to clinic for f/u.     She was recently admitted to [facility] for shortness of breath with diagnosis of acute exacerbation of COPD. However, Ms. Zayas denies any prior diagnosis of COPD (although she actively smokes). She underwent PFTs at [facility] (Dr. Mecca Appiah in 2015) that do not show any significant airflow obstruction; some diffusing capacity which may be due to obesity. She was discharged on Spiriva and a short course of steroids.       TORO   Diagnosed in [place] many years ago. Uses nocturnal oxygen. No recent sleep studies done. Previously used CPAP machine before moving to [place]. Since 2010, she has not used her CPAP machine and now uses oxygen 3 lpm through her nasal cannula after her recent surgery.  Agrees to f/u with sleep study.       Type II DM with neuropathy   She was instructed to stop insulin at her last visit since A1c was 6.9%, but to continue metformin. However, she also stopped taking metformin.   Office A1c today 8.9%.   Retinal scan done at last visit. Also seeing an ophthalmologist for use of Plaquenil (appointment next week, will send records).   No CKD. Last urine micro albumin:Cr <30 in Oct 2017.       Undifferentiated connective tissue disease   Last seen by Rheumatology in Oct 2016 at  in [place]. Referred her to rheumatology in Sept 2017, but she has just recently made an appointment with them -- scheduled in Jan 2019. Has been taking plaquenil for several years now. Refilled plaquenil. Scheduled for opthalmology appointment next week for retinal eye exam. CBC previously shows stable thrombocytopenia . Denies bleeding diatheses, epistaxis, hematuria,  although intermittent hemorrhoidal bleeding.         Patient Active Problem List    Diagnosis Date Noted   • Essential hypertension 10/30/2017     Priority: Low   • Immunocompromised (formerly [facility]) 04/29/2018   • Lupus (systemic lupus erythematosus) (formerly [facility]) 04/29/2018   • Paroxysmal SVT (supraventricular tachycardia) (formerly [facility]) 04/29/2018   • Obesity (BMI 30-39.9) 10/30/2017   • Undifferentiated connective tissue disease (formerly [facility]) 10/30/2017   • Type 2 diabetes mellitus with diabetic polyneuropathy, with long-term current use of insulin (formerly [facility]) 10/30/2017   • Restrictive lung disease 07/12/2017   • Thrombocytopenia (formerly [facility]) 01/28/2016   • Atherosclerosis of aorta (formerly [facility]) 04/21/2014   • Chronic low back pain 04/19/2014   • Current smoker 04/19/2014   • Primary osteoarthritis of both knees 02/24/2012   • Hyperlipidemia 02/24/2009       Current Outpatient Prescriptions   Medication Sig Dispense Refill   • metFORMIN ER (GLUCOPHAGE XR) 500 MG TABLET SR 24 HR Take 2 Tabs by mouth every day. 60 Tab 3   • aspirin (ASA) 81 MG Chew Tab chewable tablet Take 1 Tab by mouth every day. 100 Tab 6   • budesonide-formoterol (SYMBICORT) 160-4.5 MCG/ACT Aerosol Inhale 2 Puffs by mouth 2 Times a Day. 1 Inhaler 3   • cetirizine (ZYRTEC) 10 MG Tab Take 1 Tab by mouth every day. 30 Tab 6   • lisinopril (PRINIVIL) 10 MG Tab Take 1 Tab by mouth every day. 30 Tab 6   • cyclobenzaprine (FLEXERIL) 10 MG Tab Take 1 Tab by mouth 2 times a day as needed for Muscle Spasms. 30 Tab 2   • sertraline (ZOLOFT) 100 MG Tab Take 1 Tab by mouth every day. 30 Tab 6   • pilocarpine (SALAGEN) 5 MG Tab Take 1 Tab by mouth 3 times a day for 180 days. 90 Tab 5   • atorvastatin (LIPITOR) 40 MG Tab Take 1 Tab by mouth every day. 30 Tab 6   • hydroxychloroquine (PLAQUENIL) 200 MG Tab Take 1 tablet by mouth daily 30 Tab 0   • buPROPion SR (WELLBUTRIN-SR) 150 MG TABLET SR 12 HR sustained-release tablet Take 1 Tab by mouth 2 times a day. 60 Tab 1   • gabapentin (NEURONTIN) 300 MG Cap Take 600  "mg by mouth 3 times a day.     • nortriptyline (PAMELOR) 25 MG Cap Take 1 Cap by mouth 3 times a day. 30 Cap 2   • nicotine (NICODERM) 14 MG/24HR PATCH 24 HR Apply 1 Patch to skin as directed every 24 hours. 30 Patch 3   • Melatonin 3-10 MG Tab Take 5 mg by mouth every bedtime. 30 Tab 6   • ipratropium-albuterol (COMBIVENT RESPIMAT)  MCG/ACT Aero Soln Inhale 1 Puff by mouth 4 times a day.       No current facility-administered medications for this visit.        ROS: As per HPI. Additional pertinent symptoms as noted below.  Review of Systems   Constitutional: Negative for chills and fever.   Eyes: Negative for blurred vision, double vision, photophobia, pain and redness.   Respiratory: Negative for cough, hemoptysis, sputum production, shortness of breath and wheezing.    Cardiovascular: Negative for chest pain and palpitations.   Gastrointestinal: Positive for blood in stool ( with hemorrhoidal bleeding). Negative for abdominal pain, melena, nausea and vomiting.   Musculoskeletal: Positive for back pain. Negative for falls.   Skin: Negative for itching and rash.   Neurological: Negative for dizziness, seizures, loss of consciousness and headaches.   Psychiatric/Behavioral: Negative for depression and substance abuse.       /73   Pulse 98   Temp 36.5 °C (97.7 °F)   Ht 1.6 m (5' 3\")   Wt 91.6 kg (202 lb)   SpO2 96%   Breastfeeding? No   BMI 35.78 kg/m²     Physical Exam   Constitutional: She is oriented to person, place, and time. She appears well-developed and well-nourished. No distress.   Conversant, pleasant, in NAD. Obese habitus    HENT:   Head: Normocephalic and atraumatic.   Mouth/Throat: Oropharynx is clear and moist.   Eyes: EOM are normal. Pupils are equal, round, and reactive to light. No scleral icterus.   Cardiovascular: Normal rate, regular rhythm, normal heart sounds and intact distal pulses.  Exam reveals no gallop and no friction rub.    No murmur heard.  Pulmonary/Chest: Effort " normal and breath sounds normal. No respiratory distress. She has no wheezes. She has no rales. She exhibits no tenderness.   Genitourinary:   Genitourinary Comments: Declines pap smear or gynecological exam today   Musculoskeletal: She exhibits tenderness ( diffuse tenderness ). She exhibits no edema.   Neurological: She is alert and oriented to person, place, and time. No cranial nerve deficit. Coordination normal.   Skin: She is not diaphoretic.       Note: I have reviewed all pertinent labs and diagnostic tests associated with this visit with specific comments listed under the assessment and plan below    Assessment and Plan    1. Type 2 diabetes mellitus with diabetic polyneuropathy, with long-term current use of insulin (HCC)  - has not been taking metformin since last visit (although instructed to continue it). Counseled to comply with diabetic diet and to restart metformin 1000 mg bid    - poc A1c today 8.9%    - continue ASA 81 mg daily and atorvastatin 40 mg    - ASCVD 23.8% in next 10 years. Will attempt to increase atorvastatin to 80 mg at next visit    - smoking cessation emphasized again    - urine microalbumin:Cr 28 in Oct 2017. Diabetic foot exam in Sept 2017.      - metFORMIN ER (GLUCOPHAGE XR) 500 MG TABLET SR 24 HR; Take 2 Tabs by mouth every day.  Dispense: 60 Tab; Refill: 3  - aspirin (ASA) 81 MG Chew Tab chewable tablet; Take 1 Tab by mouth every day.  Dispense: 100 Tab; Refill: 6  - lisinopril (PRINIVIL) 10 MG Tab; Take 1 Tab by mouth every day.  Dispense: 30 Tab; Refill: 6  - atorvastatin (LIPITOR) 40 MG Tab; Take 1 Tab by mouth every day.  Dispense: 30 Tab; Refill: 6  - POCT A1C    2. Essential hypertension  - goal BP <150/90 per ACP guidelines given her comorbid conditions and limited functional status. She is a fall risk.   - lisinopril (PRINIVIL) 10 MG Tab; Take 1 Tab by mouth every day.  Dispense: 30 Tab; Refill: 6       3. Fibromyalgia  - continue gabapentin 600 mg tid and nortriptyline  25 mg tid   - followed by pain clinic, not on narcotics   - sertraline (ZOLOFT) 100 MG Tab; Take 1 Tab by mouth every day.  Dispense: 30 Tab; Refill: 6       4. Screening for breast cancer  - MA-SCREEN MAMMO W/CAD-BILAT; Future       5. Screening for colon cancer  - reports that she had a colonoscopy in [place] ~7 years ago, reportedly it was normal and was told to follow up in 10 years for next c-scope   - records are not available on Care Everywhere on Epic   - wants to follow up in ~10 years from first c-scope (~2021)       6. Recurrent major depressive disorder, in partial remission (HCC)  - no suicidal thoughts, plans or ideation. Stable.   - started on bupropion 150 bid for smoking cessation. No hx of seizures.   - sertraline (ZOLOFT) 100 MG Tab; Take 1 Tab by mouth every day.  Dispense: 30 Tab; Refill: 6       7. Dry mouth  - refill pilocarpine, hx of undifferentiated connective tissue disease   - pilocarpine (SALAGEN) 5 MG Tab; Take 1 Tab by mouth 3 times a day for 180 days.  Dispense: 90 Tab; Refill: 5      8. Undifferentiated connective tissue disease  9. Long-term use of hydroxychloroquine   10. Thrombocytopenia (HCC)  - medical staff has attempted to schedule an appointment with Ms. Zayas several times, but pt has declined or was unable to follow up several times. Ms. Zayas reports she has a f/u scheduled for Jan 2019. Counseled on importance of f/u with rheumatology especially since she is on Plaquenil. She has been on this medication for several years with [facility], so I will refill it. Discussed importance of retinal exam and monitoring of platelet count   - hydroxychloroquine (PLAQUENIL) 200 MG Tab; Take 1 tablet by mouth daily  Dispense: 30 Tab; Refill: 0  - CBC WITH DIFFERENTIAL; Future        11. Current smoker  12. Tobacco abuse  - counseling provided at every visit and again today   - no hx of seizures   - buPROPion SR (WELLBUTRIN-SR) 150 MG TABLET SR 12 HR sustained-release tablet; Take 1  Tab by mouth 2 times a day.  Dispense: 60 Tab; Refill: 1   - counseled to take once per day for first three days, then increase to twice daily if tolerated       13. TORO (obstructive sleep apnea)  - diagnosed in [place] several years ago before she moved to [place], and lost her CPAP devices after her move. She has had some weight loss since then, so she may not have TORO now. Will refer for repeat sleep study testing   - REFERRAL TO SLEEP STUDIES      14. Obesity (BMI 35.0-39.9 without comorbidity)  - counseling provided for weight loss at every visit, and including this visit   - diabetic, consistent carbohydrate diet and metformin 1000 mg bid         Followup: Return in about 5 weeks (around 8/10/2018) for Long.  DEXA scan for hx of osteopenia (2006)  Increase atorvastatin to 80 mg   Counseling for weight loss and pap smear   Review CBC, f/u retinal eye exam and refill Plaquenil if normal   f/u tobacco cessation with bupropion       Signed by: Aidan Castellanos M.D.

## 2018-07-06 NOTE — PATIENT INSTRUCTIONS
Blood test in one month after starting plaquenil again.   Continue metformin 1000 mg twice a day.   Goal smoking quit date: 8/6/18. Start taking bupropion (Wellbutrin) once a day for 3 days, then increase to twice a day.

## 2018-07-26 ENCOUNTER — TELEPHONE (OUTPATIENT)
Dept: RADIOLOGY | Facility: MEDICAL CENTER | Age: 63
End: 2018-07-26

## 2018-08-10 ENCOUNTER — OFFICE VISIT (OUTPATIENT)
Dept: INTERNAL MEDICINE | Facility: MEDICAL CENTER | Age: 63
End: 2018-08-10
Payer: MEDICARE

## 2018-08-10 VITALS
OXYGEN SATURATION: 89 % | WEIGHT: 195 LBS | DIASTOLIC BLOOD PRESSURE: 68 MMHG | SYSTOLIC BLOOD PRESSURE: 104 MMHG | TEMPERATURE: 97.3 F | HEIGHT: 63 IN | HEART RATE: 104 BPM | BODY MASS INDEX: 34.55 KG/M2

## 2018-08-10 DIAGNOSIS — J45.40 MODERATE PERSISTENT ASTHMA WITHOUT COMPLICATION: ICD-10-CM

## 2018-08-10 DIAGNOSIS — M79.7 FIBROMYALGIA: ICD-10-CM

## 2018-08-10 DIAGNOSIS — Z72.0 TOBACCO ABUSE: ICD-10-CM

## 2018-08-10 DIAGNOSIS — M32.19 OTHER SYSTEMIC LUPUS ERYTHEMATOSUS WITH OTHER ORGAN INVOLVEMENT (HCC): ICD-10-CM

## 2018-08-10 DIAGNOSIS — F17.200 CURRENT SMOKER: ICD-10-CM

## 2018-08-10 DIAGNOSIS — Z79.4 TYPE 2 DIABETES MELLITUS WITH DIABETIC POLYNEUROPATHY, WITH LONG-TERM CURRENT USE OF INSULIN (HCC): ICD-10-CM

## 2018-08-10 DIAGNOSIS — J96.11 CHRONIC RESPIRATORY FAILURE WITH HYPOXIA (HCC): ICD-10-CM

## 2018-08-10 DIAGNOSIS — M35.9 UNDIFFERENTIATED CONNECTIVE TISSUE DISEASE (HCC): ICD-10-CM

## 2018-08-10 DIAGNOSIS — E11.42 TYPE 2 DIABETES MELLITUS WITH DIABETIC POLYNEUROPATHY, WITH LONG-TERM CURRENT USE OF INSULIN (HCC): ICD-10-CM

## 2018-08-10 PROCEDURE — 99214 OFFICE O/P EST MOD 30 MIN: CPT | Mod: GC | Performed by: INTERNAL MEDICINE

## 2018-08-10 RX ORDER — HYDROCODONE BITARTRATE AND ACETAMINOPHEN 10; 325 MG/1; MG/1
1-2 TABLET ORAL EVERY 12 HOURS
COMMUNITY
End: 2021-04-29

## 2018-08-10 RX ORDER — ATORVASTATIN CALCIUM 40 MG/1
80 TABLET, FILM COATED ORAL DAILY
Qty: 60 TAB | Refills: 6 | Status: SHIPPED | OUTPATIENT
Start: 2018-08-10 | End: 2019-02-04 | Stop reason: SDUPTHER

## 2018-08-10 RX ORDER — HYDROXYCHLOROQUINE SULFATE 200 MG/1
TABLET, FILM COATED ORAL
Qty: 30 TAB | Refills: 0 | Status: SHIPPED | OUTPATIENT
Start: 2018-08-10

## 2018-08-10 RX ORDER — ALBUTEROL SULFATE 90 UG/1
2 AEROSOL, METERED RESPIRATORY (INHALATION) EVERY 6 HOURS PRN
Qty: 8.5 G | Refills: 3 | Status: SHIPPED | OUTPATIENT
Start: 2018-08-10 | End: 2019-10-24 | Stop reason: SDUPTHER

## 2018-08-10 NOTE — PATIENT INSTRUCTIONS
Get the pulmonary testing done before I see you next time. This is to make sure you don't have asthma.   You need to see a rheumatologist as soon as possible. Even if their appointment is one year out, you will need to make it because only a rheumatologist can take care of your plaquenil. I cannot refill your Plaquenil for long. This is VERY important to have a follow up with a rheumatologist.   We need records from your eye doctor   We will refill your oxygen   Ask the  about MyChart set up

## 2018-08-10 NOTE — LETTER
[facility]  Aidan Castellanos M.D.  [address]  [place] [address]  Fax: [phone]   Authorization for Release/Disclosure of   Protected Health Information   Name: ERMELINDA MANN : 1955 SSN: [id number]   Address: [address]  [place] [address] Phone:    [phone] (home)    I authorize the entity listed below to release/disclose the PHI below to:   [facility]/Aidan Castellanos M.D. and Aidan Castellanos M.D.   Provider or Entity Name:     Address   City, State, Zip   Phone:      Fax:     Reason for request: continuity of care   Information to be released:    [  ] LAST COLONOSCOPY,  including any PATH REPORT and follow-up  [  ] LAST FIT/COLOGUARD RESULT [  ] LAST DEXA  [  ] LAST MAMMOGRAM  [  ] LAST PAP  [  ] LAST LABS [  ] RETINA EXAM REPORT  [  ] IMMUNIZATION RECORDS  [  ] Release all info      [  ] Check here and initial the line next to each item to release ALL health information INCLUDING  _____ Care and treatment for drug and / or alcohol abuse  _____ HIV testing, infection status, or AIDS  _____ Genetic Testing    DATES OF SERVICE OR TIME PERIOD TO BE DISCLOSED: _____________  I understand and acknowledge that:  * This Authorization may be revoked at any time by you in writing, except if your health information has already been used or disclosed.  * Your health information that will be used or disclosed as a result of you signing this authorization could be re-disclosed by the recipient. If this occurs, your re-disclosed health information may no longer be protected by State or Federal laws.  * You may refuse to sign this Authorization. Your refusal will not affect your ability to obtain treatment.  * This Authorization becomes effective upon signing and will  on (date) __________.      If no date is indicated, this Authorization will  one (1) year from the signature date.    Name: Ermelinda Mann    Signature:   Date:     8/10/2018       PLEASE FAX REQUESTED  RECORDS BACK TO: [phone]

## 2018-08-10 NOTE — PROGRESS NOTES
Established Patient    Marisa presents today with the following:    CC: follow up for connective tissue disease, shortness of breath     HPI:    Marisa Zayas is a 63 yo F with PMHx of undifferentiated connective tissue disease (unknown if truly SLE, on Plaquenil), type II DM with neuropathy, HTN, and fibromyalgia who presents to clinic for f/u.     Undifferentiated connective tissue disease   Last seen by Rheumatology in Oct 2016 at  in [place]. Referred her to rheumatology in Sept 2017, but she has either not returned their calls or postponed their appointment or just recently cancelled it altogether. She was contacted by rheumatology multiple times per our records but ultimately did not schedule a visit with them.     Requested Plaquenil at her last visit with me in July since [place] no longer refilling them. Retinal scan done in March 2018 without evidence of diabetic retinopathy. Also had an appointment with ophthalmology in July 2018 --   Risk of ocular toxicity increases with higher doses (>5 mg/kg, around 450 mg daily) whereas she is on a lower dose of 200 mg daily of hydroxychloroquine.    CBC ordered at last visit but not done yet.     Fibromyalgia   Seen by pain specialist Dr. Rosa Cat. Recently worked with him to cut down Norco  mg to twice daily in June 2018. She continues to take Zoloft, nortriptyline and gabapentin. Continues to smoke. Does not follow a regular exercise pattern. She was recommended to quit smoking and start exercise at prior visits but had postponed it until after her surgery (for spinal stimulator).     Preventative care   Discussed importance of age appropriate screening at every visit including colonoscopy and pap smears. She has declined them at every visit.   Needs DEXA scan   Retinal scan done in March 2018 -- no diabetic retinopathy. Repeat scan due in one year     Tobacco abuse   Dyspnea   Wakes up several times per week being short of breath. Denies actively  wheezing but does endorse shortness of breath. She is till using home oxygen that was prescribed months ago when she was discharged from the hospital (for suspected asthma exacerbation). She was given an referral for pulmonary function testing but never scheduled it. Continues to smoke at least 1/2 pack per day. She was prescribed bupropion at last visit but (although she filled it) never took it. Denies any history of seizures but was worried about possibility of seizures, despite the low likelihood. Willing to go to smoking cessation program but not willing to stop smoking yet.       Patient Active Problem List    Diagnosis Date Noted   • Essential hypertension 10/30/2017     Priority: Low   • Obesity (BMI 35.0-39.9 with comorbidity) ([facility]) 07/06/2018   • Immunocompromised ([facility]) 04/29/2018   • Lupus (systemic lupus erythematosus) ([facility]) 04/29/2018   • Paroxysmal SVT (supraventricular tachycardia) ([facility]) 04/29/2018   • Obesity (BMI 30-39.9) 10/30/2017   • Undifferentiated connective tissue disease ([facility]) 10/30/2017   • Type 2 diabetes mellitus with diabetic polyneuropathy, with long-term current use of insulin ([facility]) 10/30/2017   • Restrictive lung disease 07/12/2017   • Thrombocytopenia ([facility]) 01/28/2016   • Atherosclerosis of aorta ([facility]) 04/21/2014   • Chronic low back pain 04/19/2014   • Current smoker 04/19/2014   • Primary osteoarthritis of both knees 02/24/2012   • Hyperlipidemia 02/24/2009       Current Outpatient Prescriptions   Medication Sig Dispense Refill   • HYDROcodone/acetaminophen (NORCO)  MG Tab Take 1-2 Tabs by mouth every 12 hours.     • albuterol 108 (90 Base) MCG/ACT Aero Soln inhalation aerosol Inhale 2 Puffs by mouth every 6 hours as needed for Shortness of Breath. 8.5 g 3   • ipratropium (ATROVENT) 17 MCG/ACT Aero Soln Inhale 2 Puffs by mouth every 6 hours.     • atorvastatin (LIPITOR) 40 MG Tab Take 2 Tabs by mouth every day. 60 Tab 6   • hydroxychloroquine (PLAQUENIL) 200 MG Tab Take 1  "tablet by mouth daily 30 Tab 0   • metFORMIN ER (GLUCOPHAGE XR) 500 MG TABLET SR 24 HR Take 2 Tabs by mouth every day. 60 Tab 3   • aspirin (ASA) 81 MG Chew Tab chewable tablet Take 1 Tab by mouth every day. 100 Tab 6   • budesonide-formoterol (SYMBICORT) 160-4.5 MCG/ACT Aerosol Inhale 2 Puffs by mouth 2 Times a Day. 1 Inhaler 3   • cetirizine (ZYRTEC) 10 MG Tab Take 1 Tab by mouth every day. 30 Tab 6   • lisinopril (PRINIVIL) 10 MG Tab Take 1 Tab by mouth every day. 30 Tab 6   • cyclobenzaprine (FLEXERIL) 10 MG Tab Take 1 Tab by mouth 2 times a day as needed for Muscle Spasms. 30 Tab 2   • sertraline (ZOLOFT) 100 MG Tab Take 1 Tab by mouth every day. 30 Tab 6   • pilocarpine (SALAGEN) 5 MG Tab Take 1 Tab by mouth 3 times a day for 180 days. 90 Tab 5   • nicotine (NICODERM) 14 MG/24HR PATCH 24 HR Apply 1 Patch to skin as directed every 24 hours. 30 Patch 3   • gabapentin (NEURONTIN) 300 MG Cap Take 600 mg by mouth 3 times a day.     • nortriptyline (PAMELOR) 25 MG Cap Take 1 Cap by mouth 3 times a day. 30 Cap 2     No current facility-administered medications for this visit.        ROS: A 14 point ROS has been completed and is negative except as stated above.      /68   Pulse (!) 104   Temp 36.3 °C (97.3 °F)   Ht 1.6 m (5' 3\")   Wt 88.5 kg (195 lb)   SpO2 89%   BMI 34.54 kg/m²     Physical Exam   Constitutional:  oriented to person, place, and time. No distress at rest. Dyspneic with ambulation.   Eyes: Pupils are equal, round, and reactive to light. No scleral icterus.  Cardiovascular: Normal rate, regular rhythm and normal heart sounds.  Exam reveals no gallop and no friction rub.  No murmur heard.  Pulmonary/Chest: Breath sounds normal. Tachypneic with ambulation. No wheezing, rales or rhonchi.    Musculoskeletal:   no edema.   Neurological: alert and oriented to person, place, and time. No resting tremor or intention tremor. Normal gait.   Skin: No cyanosis. Nails show no clubbing.  Psych: euthymic, " cooperative, poor judgment     Note: I have reviewed all pertinent labs and diagnostic tests associated with this visit with specific comments listed under the assessment and plan below    Assessment and Plan    1. Chronic respiratory failure with hypoxia (HCC)  - 6 min walk test performed to quality for home oxygen    - oxygen saturation fell to 88% on room air, improved to 95% on 2 lpm nasal cannula    - given prescription for home oxygen    - smoking cessation recommended. REFERRAL TO TOBACCO CESSATION PROGRAM.   - suspected underlying obstructive disease: referral to pulmonary function testing and to pulmonary (since it takes a long time for her to be seen, strong suspicion of underlying obstructive lung disease)   - DME O2 NEW SET UP    2. Moderate persistent asthma without complication  - as above   - strong suspicion of obstructive lung disease   - albuterol 108 (90 Base) MCG/ACT Aero Soln inhalation aerosol; Inhale 2 Puffs by mouth every 6 hours as needed for Shortness of Breath.  Dispense: 8.5 g; Refill: 3  - REFERRAL TO TOBACCO CESSATION PROGRAM  - REFERRAL TO PULMONOLOGY      3. Undifferentiated connective tissue disease (HCC)  - have been trying to get patient to see Rheum since Sept 2017. Records from referral show they have been trying to schedule patient since Sept 2017 but pt either has not called them back or cancelled appointment, most recently in June 2018.   - have discussed multiple times with patient that she must be seen by a specialist for Plaquenil. We discussed today that I will not be able to refill it any further after this visit, she does not make an appointment with Rheum for higher level of care    - needs CBC    - records from ophthalmology   - REFERRAL TO RHEUMATOLOGY  - hydroxychloroquine (PLAQUENIL) 200 MG Tab; Take 1 tablet by mouth daily  Dispense: 30 Tab; Refill: 0       4. Type 2 diabetes mellitus with diabetic polyneuropathy, with long-term current use of insulin (HCC)  -  continue metformin 500 mg daily. Will check HgA1c in 6 weeks and will increase metformin as appropriate. Counseled on diet and exercise again   - daily aspirin 81 mg and atorvastatin increased to 80 mg q hs   - atorvastatin (LIPITOR) 40 MG Tab; Take 2 Tabs by mouth every day.  Dispense: 60 Tab; Refill: 6      5. Current smoker  6. Tobacco abuse    - counseling provided at every visit and will be continued. Pt reports not willing to quit smoke. Prescribed bupropion previously but patient did not ever start taking it. She is already on a higher dose of sertraline so Chantix (with partial serotonin activity) will have side effects. Willing to go to smoking cessation   - discussed in very clear terms that smoking increases inflammation which will deleteriously affect her arthritis and undifferentiated connective tissue disease along with lung disease, hypoxia, dyspnea, etc. Very clearly stated that she MUST stop smoking to improve her quality of life. Spent >20 min on smoking cessation counseling today.       7. Fibromyalgia  - per pain management she is prescribed narcotics along with gabapentin, nortriptyline and sertraline   - scheduled exercise and activity      Followup: Return in about 5 weeks (around 9/14/2018) for Long.  Review smoking cessation. Review if she made appointment with Rheum (will not refill Plaquenil if not done), review ophthalmology records   Review if she made f/u appointment with pulmonary and PFTs.   Schedule appointment for pap smear at next visit.     Signed by: Aidan Castellanos M.D.

## 2018-08-11 PROBLEM — M79.7 FIBROMYALGIA: Status: ACTIVE | Noted: 2018-08-11

## 2018-08-11 PROBLEM — Z72.0 TOBACCO ABUSE: Status: ACTIVE | Noted: 2018-08-11

## 2018-08-27 DIAGNOSIS — M32.19 OTHER SYSTEMIC LUPUS ERYTHEMATOSUS WITH OTHER ORGAN INVOLVEMENT (HCC): ICD-10-CM

## 2018-08-27 NOTE — TELEPHONE ENCOUNTER
Last seen: 08/10/18 by Dr. Emanuel Santos appt: none     Was the patient seen in the last year in this department? Yes   Does patient have an active prescription for medications requested? No   Received Request Via: Pharmacy

## 2018-08-28 RX ORDER — HYDROXYCHLOROQUINE SULFATE 200 MG/1
TABLET, FILM COATED ORAL
Qty: 90 TAB | Refills: 0 | OUTPATIENT
Start: 2018-08-28

## 2018-08-28 NOTE — TELEPHONE ENCOUNTER
Pt needs to have follow up with me in clinic before further refills of hydroxychloroquine given. This was discussed with her at last visit.

## 2018-09-04 ENCOUNTER — PATIENT OUTREACH (OUTPATIENT)
Dept: HEALTH INFORMATION MANAGEMENT | Facility: OTHER | Age: 63
End: 2018-09-04

## 2018-09-04 NOTE — PROGRESS NOTES
Outcome: no answer no vm    Please transfer to Patient Outreach Team at [phone] when patient returns call.    WebIZ Checked & Epic Updated:  yes    [facility] Verified: no    Attempt # 1

## 2018-09-06 NOTE — PROGRESS NOTES
Outcome: Left Message    Please transfer to Patient Outreach Team at [phone] when patient returns call.    Attempt # 2

## 2018-09-12 NOTE — PROGRESS NOTES
Outcome: Left Message    Please transfer to Patient Outreach Team at [phone] when patient returns call.    Attempt # 3

## 2018-09-19 NOTE — PROGRESS NOTES
Outcome: Left Message with person who answered the phone    Please transfer to Patient Outreach Team at [phone] when patient returns call.    Attempt # 4

## 2018-09-25 NOTE — PROGRESS NOTES
Outcome: Left Message    Please transfer to Patient Outreach Team at [phone] when patient returns call.      Attempt # Final

## 2018-10-22 ENCOUNTER — TELEPHONE (OUTPATIENT)
Dept: INTERNAL MEDICINE | Facility: MEDICAL CENTER | Age: 63
End: 2018-10-22

## 2018-10-22 NOTE — TELEPHONE ENCOUNTER
----- Message from Aidan Castellanos M.D. sent at 10/16/2018 10:58 AM PDT -----  Please ask patient to make sure she schedules a Rheumatology appointment since we cannot refill her hydroxychloroquine. She has been referred to them multiple times but has not scheduled an appointment.     She also needs a follow up appointment with me when possible. Thank you.

## 2019-01-01 ENCOUNTER — TELEPHONE (OUTPATIENT)
Dept: INTERNAL MEDICINE | Facility: MEDICAL CENTER | Age: 64
End: 2019-01-01

## 2019-01-02 NOTE — TELEPHONE ENCOUNTER
Called to check in on Ms Marisa Zayas. Unable to reach, left voicemail reminding to make an appointment with rheumatology (for undifferentiated connective tissue disease on Plaquenil).     She has missed prior appointments or cancelled them, needs ophthalmology follow up along with scheduling rheumatology. Unfortunately, she has not made any appointments with rheumatology over the last 1.5 years despite multiple reminders and referrals. Needs eye exam while on Plaquenil. Will keep checking on Ms Zayas and assist as required until she can establish with rheumatology.

## 2019-02-04 DIAGNOSIS — Z79.4 TYPE 2 DIABETES MELLITUS WITH DIABETIC POLYNEUROPATHY, WITH LONG-TERM CURRENT USE OF INSULIN (HCC): ICD-10-CM

## 2019-02-04 DIAGNOSIS — E11.42 TYPE 2 DIABETES MELLITUS WITH DIABETIC POLYNEUROPATHY, WITH LONG-TERM CURRENT USE OF INSULIN (HCC): ICD-10-CM

## 2019-02-04 RX ORDER — ATORVASTATIN CALCIUM 40 MG/1
80 TABLET, FILM COATED ORAL DAILY
Qty: 60 TAB | Refills: 0 | Status: SHIPPED | OUTPATIENT
Start: 2019-02-04 | End: 2019-10-24 | Stop reason: SDUPTHER

## 2019-02-05 NOTE — TELEPHONE ENCOUNTER
30 day supply of atorvastatin given. Pt needs to be seen in clinic before further refills. She has no showed to her previous two appointments.

## 2019-02-15 ENCOUNTER — TELEPHONE (OUTPATIENT)
Dept: INTERNAL MEDICINE | Facility: MEDICAL CENTER | Age: 64
End: 2019-02-15

## 2019-02-15 NOTE — TELEPHONE ENCOUNTER
Called Ms Marisa Zayas again to make sure she has follow up with a doctor. She needs to be seen by rheumatology, ophthalmology as well as primary care.     Patient has unfortunately not scheduled any visits with any providers previously nd has missed multiple previous appointments. She needs to have an eye check up since she was on hydroxychloroquine as well as obtain blood work, which was discussed at multiple previous visits. Will continue to encourage follow up and provide further care to Ms Zayas.

## 2019-08-29 ENCOUNTER — TELEPHONE (OUTPATIENT)
Dept: SCHEDULING | Facility: IMAGING CENTER | Age: 64
End: 2019-08-29

## 2019-10-02 ENCOUNTER — HOSPITAL ENCOUNTER (OUTPATIENT)
Dept: PAIN MANAGEMENT | Facility: REHABILITATION | Age: 64
End: 2019-10-02
Attending: PAIN MEDICINE
Payer: MEDICARE

## 2019-10-02 ENCOUNTER — HOSPITAL ENCOUNTER (OUTPATIENT)
Dept: RADIOLOGY | Facility: REHABILITATION | Age: 64
End: 2019-10-02
Attending: PAIN MEDICINE

## 2019-10-02 VITALS
HEART RATE: 92 BPM | RESPIRATION RATE: 14 BRPM | WEIGHT: 169.97 LBS | OXYGEN SATURATION: 93 % | HEIGHT: 63 IN | SYSTOLIC BLOOD PRESSURE: 106 MMHG | DIASTOLIC BLOOD PRESSURE: 81 MMHG | TEMPERATURE: 97.7 F | BODY MASS INDEX: 30.12 KG/M2

## 2019-10-02 PROCEDURE — G0260 INJ FOR SACROILIAC JT ANESTH: HCPCS

## 2019-10-02 RX ORDER — LIDOCAINE HYDROCHLORIDE 10 MG/ML
INJECTION, SOLUTION EPIDURAL; INFILTRATION; INTRACAUDAL; PERINEURAL
Status: DISCONTINUED
Start: 2019-10-02 | End: 2019-10-03 | Stop reason: HOSPADM

## 2019-10-02 RX ORDER — TRIAMCINOLONE ACETONIDE 40 MG/ML
INJECTION, SUSPENSION INTRA-ARTICULAR; INTRAMUSCULAR
Status: DISCONTINUED
Start: 2019-10-02 | End: 2019-10-03 | Stop reason: HOSPADM

## 2019-10-02 RX ORDER — BUPIVACAINE HYDROCHLORIDE 2.5 MG/ML
INJECTION, SOLUTION EPIDURAL; INFILTRATION; INTRACAUDAL
Status: DISCONTINUED
Start: 2019-10-02 | End: 2019-10-03 | Stop reason: HOSPADM

## 2019-10-02 NOTE — NON-PROVIDER
Current medications reviewed with pt, see medications reconciliation form. Pt vj taking ASA or other blood thinners or anti-inflammatories.  Pt has a ride post-procedure(Sister to drive).  Printed and verbal discharge instructions given to pt who verbalized understanding.

## 2019-10-22 ENCOUNTER — APPOINTMENT (OUTPATIENT)
Dept: RADIOLOGY | Facility: MEDICAL CENTER | Age: 64
End: 2019-10-22
Attending: EMERGENCY MEDICINE
Payer: MEDICARE

## 2019-10-22 ENCOUNTER — HOSPITAL ENCOUNTER (EMERGENCY)
Facility: MEDICAL CENTER | Age: 64
End: 2019-10-22
Attending: EMERGENCY MEDICINE
Payer: MEDICARE

## 2019-10-22 VITALS
HEART RATE: 98 BPM | OXYGEN SATURATION: 95 % | DIASTOLIC BLOOD PRESSURE: 73 MMHG | SYSTOLIC BLOOD PRESSURE: 133 MMHG | TEMPERATURE: 96.8 F | BODY MASS INDEX: 30.08 KG/M2 | HEIGHT: 63 IN | WEIGHT: 169.75 LBS | RESPIRATION RATE: 18 BRPM

## 2019-10-22 DIAGNOSIS — J44.1 ACUTE EXACERBATION OF CHRONIC OBSTRUCTIVE PULMONARY DISEASE (COPD) (HCC): ICD-10-CM

## 2019-10-22 DIAGNOSIS — B34.9 VIRAL SYNDROME: ICD-10-CM

## 2019-10-22 DIAGNOSIS — E87.6 HYPOKALEMIA: ICD-10-CM

## 2019-10-22 DIAGNOSIS — Z72.0 TOBACCO USE: ICD-10-CM

## 2019-10-22 LAB
ANION GAP SERPL CALC-SCNC: 14 MMOL/L (ref 0–11.9)
BASOPHILS # BLD AUTO: 0.5 % (ref 0–1.8)
BASOPHILS # BLD: 0.04 K/UL (ref 0–0.12)
BUN SERPL-MCNC: 8 MG/DL (ref 8–22)
CALCIUM SERPL-MCNC: 9.1 MG/DL (ref 8.5–10.5)
CHLORIDE SERPL-SCNC: 108 MMOL/L (ref 96–112)
CO2 SERPL-SCNC: 18 MMOL/L (ref 20–33)
CREAT SERPL-MCNC: 0.56 MG/DL (ref 0.5–1.4)
EKG IMPRESSION: NORMAL
EOSINOPHIL # BLD AUTO: 0.15 K/UL (ref 0–0.51)
EOSINOPHIL NFR BLD: 1.9 % (ref 0–6.9)
ERYTHROCYTE [DISTWIDTH] IN BLOOD BY AUTOMATED COUNT: 43.3 FL (ref 35.9–50)
GLUCOSE SERPL-MCNC: 229 MG/DL (ref 65–99)
HCT VFR BLD AUTO: 42.4 % (ref 37–47)
HGB BLD-MCNC: 14.7 G/DL (ref 12–16)
IMM GRANULOCYTES # BLD AUTO: 0.02 K/UL (ref 0–0.11)
IMM GRANULOCYTES NFR BLD AUTO: 0.3 % (ref 0–0.9)
LYMPHOCYTES # BLD AUTO: 1.73 K/UL (ref 1–4.8)
LYMPHOCYTES NFR BLD: 22 % (ref 22–41)
MCH RBC QN AUTO: 34 PG (ref 27–33)
MCHC RBC AUTO-ENTMCNC: 34.7 G/DL (ref 33.6–35)
MCV RBC AUTO: 98.1 FL (ref 81.4–97.8)
MONOCYTES # BLD AUTO: 0.39 K/UL (ref 0–0.85)
MONOCYTES NFR BLD AUTO: 4.9 % (ref 0–13.4)
NEUTROPHILS # BLD AUTO: 5.55 K/UL (ref 2–7.15)
NEUTROPHILS NFR BLD: 70.4 % (ref 44–72)
NRBC # BLD AUTO: 0 K/UL
NRBC BLD-RTO: 0 /100 WBC
NT-PROBNP SERPL IA-MCNC: 252 PG/ML (ref 0–125)
PLATELET # BLD AUTO: 112 K/UL (ref 164–446)
PMV BLD AUTO: 10.4 FL (ref 9–12.9)
POTASSIUM SERPL-SCNC: 3.2 MMOL/L (ref 3.6–5.5)
RBC # BLD AUTO: 4.32 M/UL (ref 4.2–5.4)
SODIUM SERPL-SCNC: 140 MMOL/L (ref 135–145)
WBC # BLD AUTO: 7.9 K/UL (ref 4.8–10.8)

## 2019-10-22 PROCEDURE — A9270 NON-COVERED ITEM OR SERVICE: HCPCS | Performed by: EMERGENCY MEDICINE

## 2019-10-22 PROCEDURE — 83880 ASSAY OF NATRIURETIC PEPTIDE: CPT

## 2019-10-22 PROCEDURE — 80048 BASIC METABOLIC PNL TOTAL CA: CPT

## 2019-10-22 PROCEDURE — 94640 AIRWAY INHALATION TREATMENT: CPT

## 2019-10-22 PROCEDURE — 93005 ELECTROCARDIOGRAM TRACING: CPT | Performed by: EMERGENCY MEDICINE

## 2019-10-22 PROCEDURE — 71045 X-RAY EXAM CHEST 1 VIEW: CPT

## 2019-10-22 PROCEDURE — 700102 HCHG RX REV CODE 250 W/ 637 OVERRIDE(OP): Performed by: EMERGENCY MEDICINE

## 2019-10-22 PROCEDURE — 85025 COMPLETE CBC W/AUTO DIFF WBC: CPT

## 2019-10-22 PROCEDURE — 700101 HCHG RX REV CODE 250: Performed by: EMERGENCY MEDICINE

## 2019-10-22 PROCEDURE — 700111 HCHG RX REV CODE 636 W/ 250 OVERRIDE (IP): Performed by: EMERGENCY MEDICINE

## 2019-10-22 PROCEDURE — 99284 EMERGENCY DEPT VISIT MOD MDM: CPT

## 2019-10-22 RX ORDER — PREDNISONE 20 MG/1
40 TABLET ORAL DAILY
Qty: 8 TAB | Refills: 0 | Status: SHIPPED | OUTPATIENT
Start: 2019-10-22 | End: 2019-10-24

## 2019-10-22 RX ORDER — POTASSIUM CHLORIDE 20 MEQ/1
20 TABLET, EXTENDED RELEASE ORAL ONCE
Status: COMPLETED | OUTPATIENT
Start: 2019-10-22 | End: 2019-10-22

## 2019-10-22 RX ORDER — PREDNISONE 20 MG/1
60 TABLET ORAL ONCE
Status: COMPLETED | OUTPATIENT
Start: 2019-10-22 | End: 2019-10-22

## 2019-10-22 RX ORDER — AZITHROMYCIN 250 MG/1
TABLET, FILM COATED ORAL
Qty: 6 TAB | Refills: 0 | Status: SHIPPED | OUTPATIENT
Start: 2019-10-22 | End: 2019-10-24

## 2019-10-22 RX ADMIN — PREDNISONE 60 MG: 20 TABLET ORAL at 12:15

## 2019-10-22 RX ADMIN — POTASSIUM CHLORIDE 20 MEQ: 1500 TABLET, EXTENDED RELEASE ORAL at 12:34

## 2019-10-22 RX ADMIN — ALBUTEROL SULFATE 2.5 MG: 2.5 SOLUTION RESPIRATORY (INHALATION) at 11:06

## 2019-10-22 ASSESSMENT — LIFESTYLE VARIABLES
HAVE YOU EVER FELT YOU SHOULD CUT DOWN ON YOUR DRINKING: NO
HAVE PEOPLE ANNOYED YOU BY CRITICIZING YOUR DRINKING: NO
TOTAL SCORE: 0
DOES PATIENT WANT TO STOP DRINKING: NO
DO YOU DRINK ALCOHOL: NO
EVER HAD A DRINK FIRST THING IN THE MORNING TO STEADY YOUR NERVES TO GET RID OF A HANGOVER: NO
TOTAL SCORE: 0
TOTAL SCORE: 0
EVER FELT BAD OR GUILTY ABOUT YOUR DRINKING: NO
CONSUMPTION TOTAL: INCOMPLETE

## 2019-10-22 NOTE — ED NOTES
Walking O2 reading 94% at rest, down to 93% to 92% at times, states no distress, anil SOB, states improvement in breathing after DuoNeb. States ready to go home.

## 2019-10-22 NOTE — ED TRIAGE NOTES
Chief Complaint   Patient presents with   • Flu Like Symptoms     Pt reports increased SOB for 3-4 days, inhaler at home not helping   • Congestion     in chest   • Cough     nonproductive     Explained to pt triage process, made pt aware to tell this RN/staff of any changes/concerns, pt verbalized understanding of process and instructions given. Pt to ER tessa.

## 2019-10-22 NOTE — ED PROVIDER NOTES
ED Provider Note    CHIEF COMPLAINT  Chief Complaint   Patient presents with   • Flu Like Symptoms     Pt reports increased SOB for 3-4 days, inhaler at home not helping   • Congestion     in chest   • Cough     nonproductive       HPI  Marisa Zayas is a 63 y.o. female with a history of asthma, SLE, SVT, hypertension, and dyslipidemia who presents complaining of 4 to 5 days of cough, nasal congestion, and sore throat.  Patient describes mild dyspnea.  She has a tightness in her chest  with deep inspiration.  Patient states her sister with whom she lives was sick with upper respiratory symptoms last week.  Patient denies fever, chills, leg swelling, calf pain, hemoptysis, sputum.  Patient has home O2 at home but only uses it as needed.  She continues to smoke.      ALLERGIES  No Known Allergies    CURRENT MEDICATIONS  Lipitor, albuterol inhaler, Plaquenil, Symbicort, lisinopril, Zoloft    PAST MEDICAL HISTORY   has a past medical history of Arrhythmia (), Arthritis, Asthma, Depression, Diabetes (), Fibromyalgia (), Hemorrhagic disorder ([facility]), High cholesterol, Hypertension, Lupus ([facility]) (), Lupus (systemic lupus erythematosus) ([facility]), Sleep apnea, and SVT (supraventricular tachycardia) ([facility]).    SURGICAL HISTORY   has a past surgical history that includes appendectomy; cholecystectomy; tonsillectomy; laminotomy; other orthopedic surgery (); other orthopedic surgery (); and spinal cord stimulator (2018).    SOCIAL HISTORY  Social History     Tobacco Use   • Smoking status: Current Every Day Smoker     Packs/day: 0.50     Years: 20.00     Pack years: 10.00     Last attempt to quit: 2018     Years since quittin.4   • Smokeless tobacco: Never Used   • Tobacco comment: 1 ppd since teenager   Substance and Sexual Activity   • Alcohol use: No   • Drug use: No   • Sexual activity: Not Currently       Family Hx:  No history of PE/DVT      REVIEW OF SYSTEMS  See HPI for further  "details.  All other systems are negative except as above in HPI.    PHYSICAL EXAM  VITAL SIGNS: /73   Pulse 98   Temp 36 °C (96.8 °F) (Temporal)   Resp 18   Ht 1.6 m (5' 3\")   Wt 77 kg (169 lb 12.1 oz)   SpO2 95%   BMI 30.07 kg/m²     General:  WD overweight, nontoxic appearing in NAD; A+Ox3; V/S as above; afebrile, hypertensive, not hypoxic  Skin: warm and dry; good color; no rash  HEENT: NCAT; EOMs intact; PERRL; no scleral icterus   Neck: FROM; no meningismus, no LAD; trachea midline, no JVD  Cardiovascular: Regular heart rate and rhythm.  No murmurs, rubs, or gallops; pulses 2+ bilaterally radially  Lungs: Scattered expiratory wheezes with slightly decreased air movement bilaterally.  No rhonchi or rales.   Abdomen: BS present; soft; NTND; no rebound, guarding, or rigidity.  No organomegaly or pulsatile mass  Extremities: SIERRA x 4; no e/o trauma; no pedal edema; neg Vasiliy's  Neurologic: CNs III-XII grossly intact; speech clear; distal sensation intact; strength 5/5 UE/LEs  Psychiatric: Appropriate affect, normal mood    LABS  Results for orders placed or performed during the hospital encounter of 10/22/19   CBC w/ Differential   Result Value Ref Range    WBC 7.9 4.8 - 10.8 K/uL    RBC 4.32 4.20 - 5.40 M/uL    Hemoglobin 14.7 12.0 - 16.0 g/dL    Hematocrit 42.4 37.0 - 47.0 %    MCV 98.1 (H) 81.4 - 97.8 fL    MCH 34.0 (H) 27.0 - 33.0 pg    MCHC 34.7 33.6 - 35.0 g/dL    RDW 43.3 35.9 - 50.0 fL    Platelet Count 112 (L) 164 - 446 K/uL    MPV 10.4 9.0 - 12.9 fL    Neutrophils-Polys 70.40 44.00 - 72.00 %    Lymphocytes 22.00 22.00 - 41.00 %    Monocytes 4.90 0.00 - 13.40 %    Eosinophils 1.90 0.00 - 6.90 %    Basophils 0.50 0.00 - 1.80 %    Immature Granulocytes 0.30 0.00 - 0.90 %    Nucleated RBC 0.00 /100 WBC    Neutrophils (Absolute) 5.55 2.00 - 7.15 K/uL    Lymphs (Absolute) 1.73 1.00 - 4.80 K/uL    Monos (Absolute) 0.39 0.00 - 0.85 K/uL    Eos (Absolute) 0.15 0.00 - 0.51 K/uL    Baso (Absolute) 0.04 " 0.00 - 0.12 K/uL    Immature Granulocytes (abs) 0.02 0.00 - 0.11 K/uL    NRBC (Absolute) 0.00 K/uL   proBrain Natriuretic Peptide, NT   Result Value Ref Range    NT-proBNP 252 (H) 0 - 125 pg/mL   Basic Metabolic Panel (BMP)   Result Value Ref Range    Sodium 140 135 - 145 mmol/L    Potassium 3.2 (L) 3.6 - 5.5 mmol/L    Chloride 108 96 - 112 mmol/L    Glucose 229 (H) 65 - 99 mg/dL    Bun 8 8 - 22 mg/dL    Creatinine 0.56 0.50 - 1.40 mg/dL    Calcium 9.1 8.5 - 10.5 mg/dL   ESTIMATED GFR   Result Value Ref Range    GFR If African American >60 >60 mL/min/1.73 m 2    GFR If Non African American >60 >60 mL/min/1.73 m 2   EKG   Result Value Ref Range    Report       [facility] Emergency Dept.    Test Date:  2019-10-22  Pt Name:    ERMELINDA MANN                 Department: ER  MRN:        [id number]                      Room:       [facility]  Gender:     Female                       Technician: [id number]  :        [id number]                   Requested By:NAWAF PATRICK  Order #:    [id number]                    Reading MD: NAWAF PATRICK MD    Measurements  Intervals                                Axis  Rate:       97                           P:  MO:                                      QRS:        -75  QRSD:       122                          T:          18  QT:         416  QTc:        529    Interpretive Statements  sinus rhythm  NONSPECIFIC IVCD WITH LAD  ARTIFACT IN LEAD(S) III,aVR,aVL,aVF,V3,V4,V5,V6  Compared to ECG 2018 23:18:58  Intraventricular conduction delay now present  Sinus tachycardia no longer present  Right-axis deviation no longer present  Myocardial infarct finding no longer present  Early repolarization  no longer present    Electronically Signed On 10- 12:55:50 PDT by NAWAF PATRICK MD           IMAGING  DX-CHEST-PORTABLE (1 VIEW)   Final Result      Stable blunting of the left costophrenic angle may be related to trace pleural fluid or pleural thickening.       Atherosclerotic plaque.          MEDICAL RECORD  I have reviewed patient's medical record and pertinent results are listed below.      COURSE & MEDICAL DECISION MAKING  I have reviewed any medical record information, laboratory studies and radiographic results as noted.    Marisa Zayas is a 63 y.o. female smoker with a history of asthma who presents complaining of cough, congestion, shortness of breath.  I do not suspect PE or ACS.    Chest x-ray, EKG, and blood work were ordered.  A nebulizer was ordered.  Prednisone 60 mg p.o. was ordered.    Chest x-ray negative for focal consolidation and pulmonary edema.  We will treat for bronchitis with a Z-Wes given her smoking history as well as give her a prescription for a 5-day course of prednisone.  She states she has run out of her albuterol solution for the nebulizer machine so we will provide this as well.      Labs demonstrate mild hypokalemia of 3.2, glucose 229, and BNP that is slightly elevated to 252.  Patient does not appear volume overloaded and chest x-ray demonstrated no pulmonary edema.  K-Dur given.    Ambulatory pulse ox adequate.  Patient denies shortness of breath.  Patient will be discharged.  Patient was advised to follow-up with her primary care provider regarding her potassium and her breathing/oxygen therapy at home in the next 24 to 48 hours.      FINAL IMPRESSION  1. Viral syndrome     2. Acute exacerbation of chronic obstructive pulmonary disease (COPD) (HCC)     3. Tobacco use     4. Hypokalemia         Electronically signed by: Zuleika Leo, 10/22/2019 10:07 AM

## 2019-10-22 NOTE — ED NOTES
Patient given dc papers, instructons included. Instructed to  Rx at [facility] as written, pt steady gate, vss, all beongings with pt. Care transferred to self at this time.

## 2019-10-22 NOTE — DISCHARGE INSTRUCTIONS
Follow-up with your primary care provider regarding your potassium and oxygen    Stop smoking    Take Z-Wes as prescribed for likely bronchitis    Take prednisone once daily for the next 4 days starting tomorrow    Return to the ER for worsening symptoms, difficulty breathing, or other concerns    Use your albuterol inhaler or nebulizer every 4-6 hours as needed for cough/wheezing/shortness of breath

## 2019-10-24 ENCOUNTER — HOSPITAL ENCOUNTER (OUTPATIENT)
Dept: LAB | Facility: MEDICAL CENTER | Age: 64
End: 2019-10-24
Attending: NURSE PRACTITIONER
Payer: MEDICARE

## 2019-10-24 ENCOUNTER — OFFICE VISIT (OUTPATIENT)
Dept: MEDICAL GROUP | Facility: LAB | Age: 64
End: 2019-10-24
Payer: MEDICARE

## 2019-10-24 VITALS
WEIGHT: 171 LBS | HEIGHT: 63 IN | BODY MASS INDEX: 30.3 KG/M2 | SYSTOLIC BLOOD PRESSURE: 110 MMHG | HEART RATE: 100 BPM | OXYGEN SATURATION: 94 % | DIASTOLIC BLOOD PRESSURE: 76 MMHG | TEMPERATURE: 98 F

## 2019-10-24 DIAGNOSIS — Z79.4 TYPE 2 DIABETES MELLITUS WITH DIABETIC POLYNEUROPATHY, WITH LONG-TERM CURRENT USE OF INSULIN (HCC): ICD-10-CM

## 2019-10-24 DIAGNOSIS — Z11.59 NEED FOR HEPATITIS C SCREENING TEST: ICD-10-CM

## 2019-10-24 DIAGNOSIS — M35.9 UNDIFFERENTIATED CONNECTIVE TISSUE DISEASE (HCC): ICD-10-CM

## 2019-10-24 DIAGNOSIS — E78.5 HYPERLIPIDEMIA, UNSPECIFIED HYPERLIPIDEMIA TYPE: ICD-10-CM

## 2019-10-24 DIAGNOSIS — I10 ESSENTIAL HYPERTENSION: ICD-10-CM

## 2019-10-24 DIAGNOSIS — E87.6 HYPOKALEMIA: ICD-10-CM

## 2019-10-24 DIAGNOSIS — J44.9 CHRONIC OBSTRUCTIVE PULMONARY DISEASE, UNSPECIFIED COPD TYPE (HCC): ICD-10-CM

## 2019-10-24 DIAGNOSIS — E11.42 TYPE 2 DIABETES MELLITUS WITH DIABETIC POLYNEUROPATHY, WITH LONG-TERM CURRENT USE OF INSULIN (HCC): ICD-10-CM

## 2019-10-24 DIAGNOSIS — M32.19 OTHER SYSTEMIC LUPUS ERYTHEMATOSUS WITH OTHER ORGAN INVOLVEMENT (HCC): ICD-10-CM

## 2019-10-24 DIAGNOSIS — E66.9 OBESITY (BMI 30-39.9): ICD-10-CM

## 2019-10-24 DIAGNOSIS — J45.40 MODERATE PERSISTENT ASTHMA WITHOUT COMPLICATION: ICD-10-CM

## 2019-10-24 LAB
ALBUMIN SERPL BCP-MCNC: 4.3 G/DL (ref 3.2–4.9)
ALBUMIN/GLOB SERPL: 1.4 G/DL
ALP SERPL-CCNC: 74 U/L (ref 30–99)
ALT SERPL-CCNC: 24 U/L (ref 2–50)
ANION GAP SERPL CALC-SCNC: 13 MMOL/L (ref 0–11.9)
AST SERPL-CCNC: 25 U/L (ref 12–45)
BILIRUB SERPL-MCNC: 0.3 MG/DL (ref 0.1–1.5)
BUN SERPL-MCNC: 17 MG/DL (ref 8–22)
CALCIUM SERPL-MCNC: 9.3 MG/DL (ref 8.5–10.5)
CHLORIDE SERPL-SCNC: 102 MMOL/L (ref 96–112)
CO2 SERPL-SCNC: 21 MMOL/L (ref 20–33)
CREAT SERPL-MCNC: 0.8 MG/DL (ref 0.5–1.4)
EST. AVERAGE GLUCOSE BLD GHB EST-MCNC: 269 MG/DL
GLOBULIN SER CALC-MCNC: 3.1 G/DL (ref 1.9–3.5)
GLUCOSE SERPL-MCNC: 437 MG/DL (ref 65–99)
HBA1C MFR BLD: 11 % (ref 0–5.6)
HCV AB SER QL: NEGATIVE
POTASSIUM SERPL-SCNC: 3.5 MMOL/L (ref 3.6–5.5)
PROT SERPL-MCNC: 7.4 G/DL (ref 6–8.2)
SODIUM SERPL-SCNC: 136 MMOL/L (ref 135–145)

## 2019-10-24 PROCEDURE — 83036 HEMOGLOBIN GLYCOSYLATED A1C: CPT | Mod: GA

## 2019-10-24 PROCEDURE — 86803 HEPATITIS C AB TEST: CPT

## 2019-10-24 PROCEDURE — 80053 COMPREHEN METABOLIC PANEL: CPT

## 2019-10-24 PROCEDURE — 36415 COLL VENOUS BLD VENIPUNCTURE: CPT

## 2019-10-24 PROCEDURE — 99215 OFFICE O/P EST HI 40 MIN: CPT | Performed by: NURSE PRACTITIONER

## 2019-10-24 RX ORDER — LISINOPRIL 10 MG/1
10 TABLET ORAL DAILY
Qty: 30 TAB | Refills: 6 | Status: SHIPPED | OUTPATIENT
Start: 2019-10-24 | End: 2020-05-11

## 2019-10-24 RX ORDER — BUDESONIDE AND FORMOTEROL FUMARATE DIHYDRATE 160; 4.5 UG/1; UG/1
2 AEROSOL RESPIRATORY (INHALATION) 2 TIMES DAILY
Qty: 1 INHALER | Refills: 3 | Status: SHIPPED | OUTPATIENT
Start: 2019-10-24 | End: 2020-02-18

## 2019-10-24 RX ORDER — ATORVASTATIN CALCIUM 40 MG/1
80 TABLET, FILM COATED ORAL DAILY
Qty: 60 TAB | Refills: 0 | Status: ON HOLD | OUTPATIENT
Start: 2019-10-24 | End: 2021-05-03

## 2019-10-24 RX ORDER — ALBUTEROL SULFATE 90 UG/1
2 AEROSOL, METERED RESPIRATORY (INHALATION) EVERY 6 HOURS PRN
Qty: 8.5 G | Refills: 3 | Status: SHIPPED | OUTPATIENT
Start: 2019-10-24

## 2019-10-24 NOTE — PROGRESS NOTES
CC:Diagnoses of Type 2 diabetes mellitus with diabetic polyneuropathy, with long-term current use of insulin (HCC), Essential hypertension, Moderate persistent asthma without complication, Chronic obstructive pulmonary disease, unspecified COPD type (HCC), Other systemic lupus erythematosus with other organ involvement (HCC), Hypokalemia, Need for hepatitis C screening test, and Obesity (BMI 30-39.9) were pertinent to this visit.    HISTORY OF PRESENT ILLNESS: Marisa Zayas is a 63 y.o. female new to me patient with history of undifferentiated connective tissue disease, type 2 diabetes with neuropathy, hypertension, fibromyalgia, COPD.  She presents today for medication refills and to establish care.    Fibromyalgia  Patient has seen by Dr. Rosa Cat for pain management.  She is currently taking Zoloft, nortriptyline and gabapentin as well as Norco 10-3 25.  She continues to smoke.  She has been advised multiple times to quit smoking and start exercise prior to visits on different    Undifferentiated connective tissue disease  Lupus  Per patient and review of records, patient reports that she has been taking Plaquenil for several years and has been off of it for 1 year as her last provider would not refill it until she was established with rheumatology and ophthalmology.  In reviewing records it appears that she has had multiple no-shows and has not made appointment with rheumatology.  She is tearful that I will not refill her Plaquenil today without her being established with rheumatology and having seen ophthalmology recently.  I have re-referred her to both specialties today.  Patient verbalizes understanding.  Patient reports that she must have her Plaquenil for her lupus however she has been off of it for 1 year at this point.      Type II DM with neuropathy  This is a chronic problem for which patient reports she was told that she actually does not have diabetes and was told to stop taking her insulin.   In reviewing the records from her previous primary provider, it appears that she was told to stop taking the insulin and continue with metformin however she stopped taking both.  We will recheck her A1c today.  I have referred her to ophthalmology for follow-up for her diabetes and because patient is requesting Plaquenil today.  She denies any polyuria, polydipsia, polyphagia or visual changes.        Health Maintenance: Completed    Patient Active Problem List    Diagnosis Date Noted   • Essential hypertension 10/30/2017     Priority: Low   • Tobacco abuse 08/11/2018   • Fibromyalgia 08/11/2018   • Obesity (BMI 35.0-39.9 with comorbidity) ([facility]) 07/06/2018   • Immunocompromised ([facility]) 04/29/2018   • Lupus (systemic lupus erythematosus) ([facility]) 04/29/2018   • Paroxysmal SVT (supraventricular tachycardia) ([facility]) 04/29/2018   • Obesity (BMI 30-39.9) 10/30/2017   • Undifferentiated connective tissue disease ([facility]) 10/30/2017   • Type 2 diabetes mellitus with diabetic polyneuropathy, with long-term current use of insulin ([facility]) 10/30/2017   • Restrictive lung disease 07/12/2017   • Thrombocytopenia ([facility]) 01/28/2016   • Atherosclerosis of aorta ([facility]) 04/21/2014   • Chronic low back pain 04/19/2014   • Current smoker 04/19/2014   • Primary osteoarthritis of both knees 02/24/2012   • Hyperlipidemia 02/24/2009        Allergies:Patient has no known allergies.    Current Outpatient Medications   Medication Sig Dispense Refill   • atorvastatin (LIPITOR) 40 MG Tab Take 2 Tabs by mouth every day. 60 Tab 0   • lisinopril (PRINIVIL) 10 MG Tab Take 1 Tab by mouth every day. 30 Tab 6   • albuterol 108 (90 Base) MCG/ACT Aero Soln inhalation aerosol Inhale 2 Puffs by mouth every 6 hours as needed for Shortness of Breath. 8.5 g 3   • ipratropium (ATROVENT) 17 MCG/ACT Aero Soln Inhale 2 Puffs by mouth every 6 hours. 17 g 3   • budesonide-formoterol (SYMBICORT) 160-4.5 MCG/ACT Aerosol Inhale 2 Puffs by mouth 2 Times a Day. 1 Inhaler 3   •  albuterol (PROVENTIL) 2.5mg/0.5ml Nebu Soln 0.5 mL by Nebulization route every 6 hours as needed for Shortness of Breath. 60 mL 1   • HYDROcodone/acetaminophen (NORCO)  MG Tab Take 1-2 Tabs by mouth every 12 hours.     • hydroxychloroquine (PLAQUENIL) 200 MG Tab Take 1 tablet by mouth daily 30 Tab 0   • aspirin (ASA) 81 MG Chew Tab chewable tablet Take 1 Tab by mouth every day. 100 Tab 6   • cetirizine (ZYRTEC) 10 MG Tab Take 1 Tab by mouth every day. 30 Tab 6   • cyclobenzaprine (FLEXERIL) 10 MG Tab Take 1 Tab by mouth 2 times a day as needed for Muscle Spasms. 30 Tab 2   • sertraline (ZOLOFT) 100 MG Tab Take 1 Tab by mouth every day. 30 Tab 6   • gabapentin (NEURONTIN) 300 MG Cap Take 600 mg by mouth 3 times a day.       No current facility-administered medications for this visit.        Social History     Tobacco Use   • Smoking status: Current Every Day Smoker     Packs/day: 0.50     Years: 20.00     Pack years: 10.00     Last attempt to quit: 2018     Years since quittin.4   • Smokeless tobacco: Never Used   • Tobacco comment: 1 ppd since teenager   Substance Use Topics   • Alcohol use: No   • Drug use: No     Social History     Social History Narrative   • Not on file       Family History   Problem Relation Age of Onset   • Heart Disease Mother 72        Valve repair   • Heart Disease Sister 50        Cardiac arrest, Valve repair   • Genetic Disorder Daughter 30        multiple sclerosis       ROS:     Constitutional:  Negative for fever, chills, unexpected weight change, and fatigue/generalized weakness.  HEENT:  Negative for headaches, vision changes, hearing changes, ear pain, ear discharge, rhinorrhea, sinus congestion, sore throat, and neck pain.    Respiratory: Negative for cough, sputum production, chest congestion, dyspnea, wheezing, and crackles.    Cardiovascular:Negative for chest pain, palpitations, orthopnea, and bilateral lower extremity edema.   Gastrointestinal:Negative for  "heartburn, nausea, vomiting, abdominal pain, hematochezia, melena, diarrhea, constipation, and greasy/foul-smelling stools.   Genitourinary: Negative for dysuria, polyuria, hematuria, pyuria, urinary urgency, and urinary incontinence.   Musculoskeletal: Positive for back pain.  Negative for falls.  Skin: Negative for rash, itching, cyanotic skin color change.   Neurological: Negative for dizziness, tingling, tremors, focal sensory deficit, focal weakness and headaches.   Endo/Heme/Allergies: Does not bruise/bleed easily.   Psychiatric/Behavioral: Negative for suicidal/homicidal ideation and memory loss.        EXAM:   /76 (BP Location: Left arm, Patient Position: Sitting, BP Cuff Size: Adult)   Pulse 100   Temp 36.7 °C (98 °F) (Temporal)   Ht 1.6 m (5' 3\")   Wt 77.6 kg (171 lb)   SpO2 94%  Body mass index is 30.29 kg/m².    Constitutional: Obese.  Well-developed and well-nourished. Not diaphoretic. No distress.   Skin: Skin is warm and dry. No rash noted.  Head: Atraumatic without lesions.  Eyes: Conjunctivae and extraocular motions are normal. Pupils are equal, round, and reactive to light. No scleral icterus.   Ears:  External ears unremarkable. Tympanic membranes clear and intact.  Nose: Nares patent. Mucosa without edema or erythema. No discharge. No facial tenderness.  Mouth/Throat: Tongue normal. Oropharynx is clear and moist. Posterior pharynx without erythema or exudates.  Neck: Supple, trachea midline. No thyromegaly present. No cervical or supraclavicular lymphadenopathy.  Cardiovascular:  Regular rate and rhythm.   Chest: Effort normal. Scattered expiratory wheezes throughout.  No rhonchi or rales.  Abdomen: Soft, non tender, and without distention. Active bowel sounds in all four quadrants. No rebound, guarding, masses or hepatosplenomegaly.  Extremities: No cyanosis, clubbing, erythema, nor edema.   Neurological: Alert and oriented x 3. Sensation intact.   Psychiatric:  Behavior, mood, and " affect are appropriate.       ASSESSMENT/PLAN:    1. Type 2 diabetes mellitus with diabetic polyneuropathy, with long-term current use of insulin (HCC)  New to me chronic unknown control. Labs as indicated. F/u in 4 weeks to discuss.   - atorvastatin (LIPITOR) 40 MG Tab; Take 2 Tabs by mouth every day.  Dispense: 60 Tab; Refill: 0  - lisinopril (PRINIVIL) 10 MG Tab; Take 1 Tab by mouth every day.  Dispense: 30 Tab; Refill: 6  - HEMOGLOBIN A1C; Future  - MICROALBUMIN CREAT RATIO URINE; Future  - Lipid profile; Future      2. Essential hypertension  New to me chronic well controlled. Refill lisinopril.   Pt understands that lifestyle changes including weight loss, exercise, and low salt/DASH diet along with medication will help pt achieve goal BP.  Pt understands that uncontrolled BP causes increased morbidity and mortality including heart attack, stroke, and kidney failure.       - lisinopril (PRINIVIL) 10 MG Tab; Take 1 Tab by mouth every day.  Dispense: 30 Tab; Refill: 6    3. Moderate persistent asthma without complication  4. Chronic obstructive pulmonary disease, unspecified COPD type (HCC)  New to me chronic uncontrolled. Patient has had multiple referrals to pulmonology and has not set up appts for pulmonary function testing or to establish care.   - albuterol 108 (90 Base) MCG/ACT Aero Soln inhalation aerosol; Inhale 2 Puffs by mouth every 6 hours as needed for Shortness of Breath.  Dispense: 8.5 g; Refill: 3  - ipratropium (ATROVENT) 17 MCG/ACT Aero Soln; Inhale 2 Puffs by mouth every 6 hours.  Dispense: 17 g; Refill: 3  - budesonide-formoterol (SYMBICORT) 160-4.5 MCG/ACT Aerosol; Inhale 2 Puffs by mouth 2 Times a Day.  Dispense: 1 Inhaler; Refill: 3  - albuterol (PROVENTIL) 2.5mg/0.5ml Nebu Soln; 0.5 mL by Nebulization route every 6 hours as needed for Shortness of Breath.  Dispense: 60 mL; Refill: 1    - REFERRAL TO PULMONOLOGY    5. Undifferentiated connective tissue disease (HCC)  New to me chronic  uncontrolled.  Patient believes she has lupus however I cannot see that this has been definitively diagnosed ever by rheumatology nor can I find any labs to support.  She has been off her Plaquenil for over 1 year.  I have reviewed what little records there are in care everywhere from rheumatology in [place].  Somewhat inconsistent history and noncompliant with follow-up.  She is somewhat upset with me that I will not refill her Plaquenil today however I have indicated that should she get in with ophthalmology before and have appointment set up with rheumatology prior to next visit I will refill her Plaquenil until her appointment with rheumatology.  We will re-refer to rheumatology and ophthalmology at this time  - REFERRAL TO OPHTHALMOLOGY  - REFERRAL TO RHEUMATOLOGY    6. Hypokalemia  Asymptomatic.  Noted on most recent hospital visit.  Recheck labsA  - Comp Metabolic Panel; Future    7. Need for hepatitis C screening test  - HEP C VIRUS ANTIBODY; Future    8. Obesity (BMI 30-39.9)  Chronic, uncontrolled, patient advised to pursue lifestyle changes, particularly cardiovascular exercise and increasing proportion of plant-based nutrition.    - Patient identified as having weight management issue.  Appropriate orders and counseling given.      Return in about 4 weeks (around 11/21/2019).     Total 40 minutes face-to-face time spent with patient, with greater than 50% of the total time discussing patient's issues and symptoms as listed above in assessment and plan, as well as managing coordination of care for future evaluation and treatment.       Please note that this dictation was created using voice recognition software. I have made every reasonable attempt to correct obvious errors, but I expect that there are errors of grammar and possibly content that I did not discover before finalizing the note.

## 2019-10-28 ENCOUNTER — TELEPHONE (OUTPATIENT)
Dept: MEDICAL GROUP | Facility: LAB | Age: 64
End: 2019-10-28

## 2019-10-28 NOTE — TELEPHONE ENCOUNTER
----- Message from JEANNIE Bonner sent at 10/28/2019  1:16 PM PDT -----  Can you please call Marisa and let her know that her A1c was 11 meaning her diabetes is very out of control and she needs to restart her metformin medication now prior to her next appt. If she needs a refill please let me know. Thanks Lizet

## 2019-10-28 NOTE — RESULT ENCOUNTER NOTE
Can you please call Marisa and let her know that her A1c was 11 meaning her diabetes is very out of control and she needs to restart her metformin medication now prior to her next appt. If she needs a refill please let me know. Thanks Lizet

## 2019-10-29 ENCOUNTER — TELEPHONE (OUTPATIENT)
Dept: MEDICAL GROUP | Facility: LAB | Age: 64
End: 2019-10-29

## 2019-10-29 ENCOUNTER — APPOINTMENT (OUTPATIENT)
Dept: RADIOLOGY | Facility: MEDICAL CENTER | Age: 64
End: 2019-10-29
Attending: EMERGENCY MEDICINE
Payer: MEDICARE

## 2019-10-29 ENCOUNTER — HOSPITAL ENCOUNTER (EMERGENCY)
Facility: MEDICAL CENTER | Age: 64
End: 2019-10-29
Attending: EMERGENCY MEDICINE
Payer: MEDICARE

## 2019-10-29 VITALS
RESPIRATION RATE: 18 BRPM | TEMPERATURE: 96.6 F | HEART RATE: 74 BPM | HEIGHT: 63 IN | WEIGHT: 168.43 LBS | OXYGEN SATURATION: 100 % | BODY MASS INDEX: 29.84 KG/M2 | DIASTOLIC BLOOD PRESSURE: 133 MMHG | SYSTOLIC BLOOD PRESSURE: 151 MMHG

## 2019-10-29 DIAGNOSIS — J45.901 MODERATE ASTHMA WITH ACUTE EXACERBATION, UNSPECIFIED WHETHER PERSISTENT: ICD-10-CM

## 2019-10-29 DIAGNOSIS — R06.02 SHORTNESS OF BREATH: ICD-10-CM

## 2019-10-29 PROCEDURE — 99284 EMERGENCY DEPT VISIT MOD MDM: CPT

## 2019-10-29 PROCEDURE — 94640 AIRWAY INHALATION TREATMENT: CPT

## 2019-10-29 PROCEDURE — 700101 HCHG RX REV CODE 250: Performed by: EMERGENCY MEDICINE

## 2019-10-29 PROCEDURE — 71046 X-RAY EXAM CHEST 2 VIEWS: CPT

## 2019-10-29 RX ORDER — ALBUTEROL SULFATE 2.5 MG/3ML
2.5 SOLUTION RESPIRATORY (INHALATION) EVERY 4 HOURS PRN
Qty: 30 BULLET | Refills: 0 | Status: SHIPPED | OUTPATIENT
Start: 2019-10-29

## 2019-10-29 RX ORDER — IPRATROPIUM BROMIDE AND ALBUTEROL SULFATE 2.5; .5 MG/3ML; MG/3ML
3 SOLUTION RESPIRATORY (INHALATION)
Status: DISCONTINUED | OUTPATIENT
Start: 2019-10-29 | End: 2019-10-29 | Stop reason: HOSPADM

## 2019-10-29 RX ORDER — PREDNISONE 20 MG/1
40 TABLET ORAL DAILY
Qty: 10 TAB | Refills: 0 | Status: SHIPPED | OUTPATIENT
Start: 2019-10-29 | End: 2021-04-29

## 2019-10-29 RX ADMIN — IPRATROPIUM BROMIDE AND ALBUTEROL SULFATE 3 ML: .5; 3 SOLUTION RESPIRATORY (INHALATION) at 10:57

## 2019-10-29 NOTE — FLOWSHEET NOTE
10/29/19 1057   Interdisciplinary Plan of Care-Goals (Indications)   Bronchodilator Indications Strong Subjective / Objective Improvement   Interdisciplinary Plan of Care-Outcomes    Bronchodilator Outcome Patient at Stable Baseline   Education   Education Yes - Pt. / Family has been Instructed in use of Respiratory Medications and Adverse Reactions   RT Assessment of Delivered Medications   Evaluation of Medication Delivery Daily Yes-- Pt /Family has been Instructed in use of Respiratory Medications and Adverse Reactions   SVN Group   #SVN Performed Yes   Given By: Mouthpiece   Date SVN Last Changed 10/29/19   Date SVN Next Change Due (Q 7 Days) 11/05/19   Respiratory WDL   Respiratory (WDL) X   Chest Exam   Respiration 18   Pulse 68   Breath Sounds   Pre/Post Intervention Pre Intervention Assessment   RUL Breath Sounds Diminished   RML Breath Sounds Diminished   RLL Breath Sounds Diminished   RADHA Breath Sounds Diminished   LLL Breath Sounds Diminished   Oximetry   Continuous Oximetry Yes   O2 Alarms Set & Reviewed Yes   Oxygen   Pulse Oximetry 93 %   O2 (LPM) 0   FiO2% 21 %   O2 Daily Delivery Respiratory  Room Air with O2 Available

## 2019-10-29 NOTE — ED PROVIDER NOTES
ED Provider  Scribed for Tong Mejia D.O. by Chan Dodson. 10/29/2019  10:22 AM    Means of arrival:Walk-in  History obtained from:Patient  History limited by: None    CHIEF COMPLAINT  Chief Complaint   Patient presents with   • Shortness of Breath   • Asthma       HPI  Marisa Zayas is a 63 y.o. Female, with a history of asthma, who presents to the ED for evaluation of constant mild shortness of breath onset 1 week ago. The patient states her current symptoms feel similar to prior flare ups of her asthma and seasonal allergies. The patient affirms additional symptoms of cough and sinus congestion. The patient presented to this ED one week ago for evaluation of similar shortness of breath and was given steroids and inhalers which reportedly alleviated her symptoms. She was also discharged home with an antibiotic prescription. The patient finished her 5 day course of steroids. The patient reports her inhalers have not been alleviating her symptoms over the past several days. The patient uses home oxygen at night secondary to sleep apnea. The patient denies any fever, chest pain, dizziness, or sputum production. The patient is a current every day tobacco smoker.     REVIEW OF SYSTEMS  See HPI for further details. All other systems are negative.     PAST MEDICAL HISTORY   has a past medical history of Arrhythmia (), Arthritis, Asthma, Depression, Diabetes ([facility]), Fibromyalgia (), Hemorrhagic disorder ([facility]), High cholesterol, Hypertension, Lupus ([facility]) (), Lupus (systemic lupus erythematosus) ([facility]), Sleep apnea, and SVT (supraventricular tachycardia) ([facility]).    SOCIAL HISTORY  Social History     Tobacco Use   • Smoking status: Current Every Day Smoker     Packs/day: 0.50     Years: 20.00     Pack years: 10.00     Last attempt to quit: 2018     Years since quittin.4   • Smokeless tobacco: Never Used   • Tobacco comment: 1 ppd since teenager   Substance and Sexual Activity   • Alcohol use: No  "  • Drug use: No   • Sexual activity: Not Currently       SURGICAL HISTORY   has a past surgical history that includes appendectomy; cholecystectomy; tonsillectomy; laminotomy; other orthopedic surgery (2016); other orthopedic surgery (1990); and spinal cord stimulator (4/19/2018).    CURRENT MEDICATIONS  Home Medications     Reviewed by Steve Spear R.N. (Registered Nurse) on 10/29/19 at 0954  Med List Status: Partial   Medication Last Dose Status   albuterol (PROVENTIL) 2.5mg/0.5ml Nebu Soln  Active   albuterol 108 (90 Base) MCG/ACT Aero Soln inhalation aerosol  Active   aspirin (ASA) 81 MG Chew Tab chewable tablet  Active   atorvastatin (LIPITOR) 40 MG Tab  Active   budesonide-formoterol (SYMBICORT) 160-4.5 MCG/ACT Aerosol  Active   cetirizine (ZYRTEC) 10 MG Tab  Active   cyclobenzaprine (FLEXERIL) 10 MG Tab  Active   gabapentin (NEURONTIN) 300 MG Cap  Active   HYDROcodone/acetaminophen (NORCO)  MG Tab  Active   hydroxychloroquine (PLAQUENIL) 200 MG Tab  Active   ipratropium (ATROVENT) 17 MCG/ACT Aero Soln  Active   lisinopril (PRINIVIL) 10 MG Tab  Active   sertraline (ZOLOFT) 100 MG Tab  Active                ALLERGIES  No Known Allergies    PHYSICAL EXAM  VITAL SIGNS: BP (!) 189/94   Pulse 81   Temp 35.9 °C (96.6 °F) (Temporal)   Resp 18   Ht 1.6 m (5' 3\")   Wt 76.4 kg (168 lb 6.9 oz)   SpO2 94%   BMI 29.84 kg/m²   Constitutional: Alert in no apparent distress.  HENT: No signs of trauma, mucous membranes are moist  Eyes: Conjunctiva normal, Non-icteric.   Neck: Normal range of motion, No tenderness, Supple.  Lymphatic: No lymphadenopathy noted.   Cardiovascular: Regular rate and rhythm, no murmurs.   Thorax & Lungs: Mild exertional dyspnea with getting out of bed. Dry cough. Good air movement with diffuse mil wheezes. No respiratory distress, No chest tenderness.   Abdomen: Bowel sounds normal, Soft, No tenderness, No masses, No pulsatile masses. No peritoneal signs.  Skin: Warm, Dry, normal " color.   Back: No bony tenderness, No CVA tenderness.   Extremities: No edema, No tenderness, No cyanosis  Musculoskeletal: Good range of motion in all major joints. No tenderness to palpation or major deformities noted.   Neurologic: Alert and oriented x4, Normal motor function, Normal sensory function, No focal deficits noted.   Psychiatric: Affect normal, Judgment normal, Mood normal.     MEDICAL DECISION MAKING  This is a 63 y.o. female who presents with complaints of shortness of breath.  Known history of asthma, she recently was treated with steroid burst, antibiotics and her symptoms were improving but now they have returned.  Her chest x-ray was visualized by me shows no pneumonia.  She was given DuoNeb with improvement of her shortness of breath and resolution of her wheezes.  She is discharged home with albuterol she has machine at home.  And she is given additional steroid medications.  She has a primary care doctor to follow-up with an as a referral to pulmonology for next month.    DIAGNOSTIC STUDIES / PROCEDURES    RADIOLOGY  DX-CHEST-2 VIEWS   Final Result      1.  Minimal LEFT pleural fluid versus reactive change, unchanged from prior exam.   2.  No pneumonia or overt pulmonary edema.        The radiologist's interpretations of all radiological studies have been reviewed by me.        COURSE  Pertinent Labs & Imaging studies reviewed. (See chart for details)    Review of past medical records shows the patient was seen at this ED on 10/22/2019 for asthma symptoms.    10:22 AM - Patient seen and examined at bedside. I informed the patient of my plan to run diagnostic studies to evaluate their symptoms including chest X-ray to evaluation for possible pneumonia. Patient verbalizes understanding and support with my plan of care. The patient will be medicated with Duoneb. Ordered for DX-Chest to evaluate her symptoms.     11:45 AM - I reevaluated the patient at bedside. The patient informs me her shortness  of breath has completely resolved following Duoneb administration. I discussed the patient's diagnostic study results which show no acute pneumonia. The patient verbalizes they feel comfortable going home. The patient is stable for discharge at this time and will return for any new or worsening symptoms. I discussed plan for discharge and follow up as outlined below. Patient verbalizes understanding and support with my plan for discharge.     DISPOSITION:  Patient will be discharged home in stable condition.    FOLLOW UP:  Lizet Holloway, TERELL.P.R.N.  [address] [address]  [phone]            OUTPATIENT MEDICATIONS:  New Prescriptions    ALBUTEROL (PROVENTIL) 2.5MG/3ML NEBU SOLN SOLUTION FOR NEBULIZATION    3 mL by Nebulization route every four hours as needed for Shortness of Breath.    PREDNISONE (DELTASONE) 20 MG TAB    Take 2 Tabs by mouth every day.         FINAL IMPRESSION  1. Shortness of breath    2. Moderate asthma with acute exacerbation, unspecified whether persistent         Chan SAEED (Tosha), am scribing for, and in the presence of, Tong Mejia D.O..    Electronically signed by: Chan Dodson (Tosha), 10/29/2019    ITong D.O. personally performed the services described in this documentation, as scribed by Chan Dodson in my presence, and it is both accurate and complete.    The note accurately reflects work and decisions made by me.  Tong Mejia  10/29/2019  12:54 PM

## 2019-10-29 NOTE — TELEPHONE ENCOUNTER
1. Caller Name: Elmer (Pharmacy)               Call Back Number: [phone]      Patient approves a detailed voicemail message: N\A    Called and started PA, was informed the atorvastatin 40 mg twice q day is communicational unavailable, we will find out in the next 72 hours if approved.

## 2019-10-29 NOTE — ED TRIAGE NOTES
"Pt to triage, c/o SOB, states \" she has asthma, was seen here 5 days ago, took antibx and steroids, started to feel better, but needs a PMD and more medications. States no one will see her, and she doesn't want a NP, she wants a doctor\", would like to talk with scheduling   "

## 2019-11-12 ENCOUNTER — TELEPHONE (OUTPATIENT)
Dept: MEDICAL GROUP | Facility: LAB | Age: 64
End: 2019-11-12

## 2019-11-12 NOTE — TELEPHONE ENCOUNTER
1. Caller Name: Marisa Zayas   Call Back Number: [phone] (home)         Patient approves a detailed voicemail message: N\A      Patient called and LVM asking for a call back in regards to her referral getting denied=, patient did not specify which referral.

## 2019-11-12 NOTE — TELEPHONE ENCOUNTER
1. Caller Name: Marisa Zayas    Call Back Number: [phone] (home)         Patient approves a detailed voicemail message: N\A    Called and LVM trying to get a hold of psatient waiting for call back in regards to previous VM.

## 2019-12-06 ENCOUNTER — HOSPITAL ENCOUNTER (OUTPATIENT)
Dept: LAB | Facility: MEDICAL CENTER | Age: 64
End: 2019-12-06
Attending: ANESTHESIOLOGY
Payer: MEDICARE

## 2019-12-06 LAB
ALBUMIN SERPL BCP-MCNC: 4.3 G/DL (ref 3.2–4.9)
ALBUMIN/GLOB SERPL: 1.5 G/DL
ALP SERPL-CCNC: 65 U/L (ref 30–99)
ALT SERPL-CCNC: 43 U/L (ref 2–50)
ANION GAP SERPL CALC-SCNC: 9 MMOL/L (ref 0–11.9)
AST SERPL-CCNC: 34 U/L (ref 12–45)
BILIRUB SERPL-MCNC: 0.3 MG/DL (ref 0.1–1.5)
BUN SERPL-MCNC: 19 MG/DL (ref 8–22)
CALCIUM SERPL-MCNC: 9.5 MG/DL (ref 8.5–10.5)
CHLORIDE SERPL-SCNC: 105 MMOL/L (ref 96–112)
CO2 SERPL-SCNC: 24 MMOL/L (ref 20–33)
CREAT SERPL-MCNC: 0.7 MG/DL (ref 0.5–1.4)
GLOBULIN SER CALC-MCNC: 2.9 G/DL (ref 1.9–3.5)
GLUCOSE SERPL-MCNC: 293 MG/DL (ref 65–99)
POTASSIUM SERPL-SCNC: 3.8 MMOL/L (ref 3.6–5.5)
PROT SERPL-MCNC: 7.2 G/DL (ref 6–8.2)
SODIUM SERPL-SCNC: 138 MMOL/L (ref 135–145)

## 2019-12-06 PROCEDURE — 36415 COLL VENOUS BLD VENIPUNCTURE: CPT

## 2019-12-06 PROCEDURE — 80053 COMPREHEN METABOLIC PANEL: CPT

## 2019-12-17 ENCOUNTER — APPOINTMENT (OUTPATIENT)
Dept: PULMONOLOGY | Facility: HOSPICE | Age: 64
End: 2019-12-17
Payer: MEDICARE

## 2020-02-17 DIAGNOSIS — J44.9 CHRONIC OBSTRUCTIVE PULMONARY DISEASE, UNSPECIFIED COPD TYPE (HCC): ICD-10-CM

## 2020-02-17 DIAGNOSIS — J45.40 MODERATE PERSISTENT ASTHMA WITHOUT COMPLICATION: ICD-10-CM

## 2020-02-18 RX ORDER — BUDESONIDE AND FORMOTEROL FUMARATE DIHYDRATE 160; 4.5 UG/1; UG/1
AEROSOL RESPIRATORY (INHALATION)
Qty: 10.2 G | Refills: 3 | Status: SHIPPED | OUTPATIENT
Start: 2020-02-18

## 2020-02-18 NOTE — TELEPHONE ENCOUNTER
Received request via: Pharmacy    Was the patient seen in the last year in this department? Yes  10/24/19  Does the patient have an active prescription (recently filled or refills available) for medication(s) requested? No

## 2020-05-11 DIAGNOSIS — I10 ESSENTIAL HYPERTENSION: ICD-10-CM

## 2020-05-11 DIAGNOSIS — Z79.4 TYPE 2 DIABETES MELLITUS WITH DIABETIC POLYNEUROPATHY, WITH LONG-TERM CURRENT USE OF INSULIN (HCC): ICD-10-CM

## 2020-05-11 DIAGNOSIS — E11.42 TYPE 2 DIABETES MELLITUS WITH DIABETIC POLYNEUROPATHY, WITH LONG-TERM CURRENT USE OF INSULIN (HCC): ICD-10-CM

## 2020-05-11 RX ORDER — LISINOPRIL 10 MG/1
TABLET ORAL
Qty: 30 TAB | Refills: 6 | Status: SHIPPED | OUTPATIENT
Start: 2020-05-11

## 2020-07-14 ENCOUNTER — HOSPITAL ENCOUNTER (OUTPATIENT)
Dept: RADIOLOGY | Facility: MEDICAL CENTER | Age: 65
End: 2020-07-14
Attending: PHYSICIAN ASSISTANT
Payer: MEDICARE

## 2020-07-14 DIAGNOSIS — M54.16 LUMBAR RADICULOPATHY: ICD-10-CM

## 2020-07-14 PROCEDURE — 72110 X-RAY EXAM L-2 SPINE 4/>VWS: CPT

## 2020-09-11 ENCOUNTER — HOSPITAL ENCOUNTER (OUTPATIENT)
Dept: LAB | Facility: MEDICAL CENTER | Age: 65
End: 2020-09-11
Attending: PAIN MEDICINE
Payer: MEDICARE

## 2020-09-11 ENCOUNTER — HOSPITAL ENCOUNTER (OUTPATIENT)
Dept: RADIOLOGY | Facility: MEDICAL CENTER | Age: 65
End: 2020-09-11
Attending: PAIN MEDICINE
Payer: MEDICARE

## 2020-09-11 DIAGNOSIS — Z01.812 PRE-OPERATIVE LABORATORY EXAMINATION: ICD-10-CM

## 2020-09-11 LAB
ALBUMIN SERPL BCP-MCNC: 4.7 G/DL (ref 3.2–4.9)
ALBUMIN/GLOB SERPL: 1.9 G/DL
ALP SERPL-CCNC: 82 U/L (ref 30–99)
ALT SERPL-CCNC: 30 U/L (ref 2–50)
ANION GAP SERPL CALC-SCNC: 14 MMOL/L (ref 7–16)
APPEARANCE UR: CLEAR
AST SERPL-CCNC: 24 U/L (ref 12–45)
BASOPHILS # BLD AUTO: 0.8 % (ref 0–1.8)
BASOPHILS # BLD: 0.06 K/UL (ref 0–0.12)
BILIRUB SERPL-MCNC: <0.2 MG/DL (ref 0.1–1.5)
BILIRUB UR QL STRIP.AUTO: NEGATIVE
BUN SERPL-MCNC: 14 MG/DL (ref 8–22)
CALCIUM SERPL-MCNC: 9.4 MG/DL (ref 8.5–10.5)
CAOX CRY #/AREA URNS HPF: NORMAL /HPF
CHLORIDE SERPL-SCNC: 105 MMOL/L (ref 96–112)
CO2 SERPL-SCNC: 23 MMOL/L (ref 20–33)
COLOR UR: YELLOW
CREAT SERPL-MCNC: 0.61 MG/DL (ref 0.5–1.4)
EOSINOPHIL # BLD AUTO: 0.23 K/UL (ref 0–0.51)
EOSINOPHIL NFR BLD: 3 % (ref 0–6.9)
EPI CELLS #/AREA URNS HPF: NORMAL /HPF
ERYTHROCYTE [DISTWIDTH] IN BLOOD BY AUTOMATED COUNT: 44.6 FL (ref 35.9–50)
GLOBULIN SER CALC-MCNC: 2.5 G/DL (ref 1.9–3.5)
GLUCOSE SERPL-MCNC: 165 MG/DL (ref 65–99)
GLUCOSE UR STRIP.AUTO-MCNC: NEGATIVE MG/DL
HCT VFR BLD AUTO: 40.9 % (ref 37–47)
HGB BLD-MCNC: 13.9 G/DL (ref 12–16)
IMM GRANULOCYTES # BLD AUTO: 0.02 K/UL (ref 0–0.11)
IMM GRANULOCYTES NFR BLD AUTO: 0.3 % (ref 0–0.9)
KETONES UR STRIP.AUTO-MCNC: NEGATIVE MG/DL
LEUKOCYTE ESTERASE UR QL STRIP.AUTO: ABNORMAL
LYMPHOCYTES # BLD AUTO: 3.09 K/UL (ref 1–4.8)
LYMPHOCYTES NFR BLD: 40 % (ref 22–41)
MCH RBC QN AUTO: 33.6 PG (ref 27–33)
MCHC RBC AUTO-ENTMCNC: 34 G/DL (ref 33.6–35)
MCV RBC AUTO: 98.8 FL (ref 81.4–97.8)
MICRO URNS: ABNORMAL
MONOCYTES # BLD AUTO: 0.34 K/UL (ref 0–0.85)
MONOCYTES NFR BLD AUTO: 4.4 % (ref 0–13.4)
NEUTROPHILS # BLD AUTO: 3.99 K/UL (ref 2–7.15)
NEUTROPHILS NFR BLD: 51.5 % (ref 44–72)
NITRITE UR QL STRIP.AUTO: NEGATIVE
NRBC # BLD AUTO: 0 K/UL
NRBC BLD-RTO: 0 /100 WBC
PH UR STRIP.AUTO: 6 [PH] (ref 5–8)
PLATELET # BLD AUTO: 127 K/UL (ref 164–446)
PMV BLD AUTO: 10.8 FL (ref 9–12.9)
POTASSIUM SERPL-SCNC: 3.9 MMOL/L (ref 3.6–5.5)
PROT SERPL-MCNC: 7.2 G/DL (ref 6–8.2)
PROT UR QL STRIP: 30 MG/DL
RBC # BLD AUTO: 4.14 M/UL (ref 4.2–5.4)
RBC UR QL AUTO: NEGATIVE
SODIUM SERPL-SCNC: 142 MMOL/L (ref 135–145)
SP GR UR STRIP.AUTO: 1.03
UROBILINOGEN UR STRIP.AUTO-MCNC: 1 MG/DL
WBC # BLD AUTO: 7.7 K/UL (ref 4.8–10.8)
WBC #/AREA URNS HPF: NORMAL /HPF

## 2020-09-11 PROCEDURE — 81001 URINALYSIS AUTO W/SCOPE: CPT

## 2020-09-11 PROCEDURE — 80053 COMPREHEN METABOLIC PANEL: CPT

## 2020-09-11 PROCEDURE — 71046 X-RAY EXAM CHEST 2 VIEWS: CPT

## 2020-09-11 PROCEDURE — 85025 COMPLETE CBC W/AUTO DIFF WBC: CPT

## 2020-09-11 PROCEDURE — 36415 COLL VENOUS BLD VENIPUNCTURE: CPT

## 2021-04-29 ENCOUNTER — PRE-ADMISSION TESTING (OUTPATIENT)
Dept: ADMISSIONS | Facility: MEDICAL CENTER | Age: 66
End: 2021-04-29
Attending: PAIN MEDICINE
Payer: MEDICARE

## 2021-04-29 DIAGNOSIS — Z01.812 PRE-OPERATIVE LABORATORY EXAMINATION: ICD-10-CM

## 2021-04-29 LAB
ERYTHROCYTE [DISTWIDTH] IN BLOOD BY AUTOMATED COUNT: 46.5 FL (ref 35.9–50)
HCT VFR BLD AUTO: 43.2 % (ref 37–47)
HGB BLD-MCNC: 14.4 G/DL (ref 12–16)
INR PPP: 0.84 (ref 0.87–1.13)
MCH RBC QN AUTO: 33.2 PG (ref 27–33)
MCHC RBC AUTO-ENTMCNC: 33.3 G/DL (ref 33.6–35)
MCV RBC AUTO: 99.5 FL (ref 81.4–97.8)
PLATELET # BLD AUTO: 168 K/UL (ref 164–446)
PMV BLD AUTO: 10.6 FL (ref 9–12.9)
PROTHROMBIN TIME: 11.8 SEC (ref 12–14.6)
RBC # BLD AUTO: 4.34 M/UL (ref 4.2–5.4)
WBC # BLD AUTO: 11.6 K/UL (ref 4.8–10.8)

## 2021-04-29 PROCEDURE — 36415 COLL VENOUS BLD VENIPUNCTURE: CPT

## 2021-04-29 PROCEDURE — 85610 PROTHROMBIN TIME: CPT

## 2021-04-29 PROCEDURE — 85027 COMPLETE CBC AUTOMATED: CPT

## 2021-04-29 RX ORDER — TIZANIDINE 4 MG/1
8 TABLET ORAL DAILY
COMMUNITY

## 2021-04-29 RX ORDER — MELOXICAM 7.5 MG/1
15 TABLET ORAL DAILY
COMMUNITY

## 2021-04-29 RX ORDER — IBUPROFEN AND FAMOTIDINE 26.6; 8 MG/1; MG/1
1 TABLET ORAL 3 TIMES DAILY
COMMUNITY

## 2021-04-29 RX ORDER — CLONIDINE HYDROCHLORIDE 0.1 MG/1
0.1 TABLET ORAL DAILY
COMMUNITY

## 2021-04-29 RX ORDER — HYDROCODONE BITARTRATE AND ACETAMINOPHEN 7.5; 325 MG/1; MG/1
1 TABLET ORAL EVERY 6 HOURS PRN
COMMUNITY

## 2021-04-29 RX ORDER — GABAPENTIN 400 MG/1
800 CAPSULE ORAL 2 TIMES DAILY
COMMUNITY

## 2021-04-29 ASSESSMENT — FIBROSIS 4 INDEX: FIB4 SCORE: 2.24

## 2021-05-03 ENCOUNTER — APPOINTMENT (OUTPATIENT)
Dept: RADIOLOGY | Facility: MEDICAL CENTER | Age: 66
End: 2021-05-03
Attending: PAIN MEDICINE
Payer: MEDICARE

## 2021-05-03 ENCOUNTER — HOSPITAL ENCOUNTER (OUTPATIENT)
Facility: MEDICAL CENTER | Age: 66
End: 2021-05-03
Attending: PAIN MEDICINE | Admitting: RADIOLOGY
Payer: MEDICARE

## 2021-05-03 ENCOUNTER — APPOINTMENT (OUTPATIENT)
Dept: RADIOLOGY | Facility: MEDICAL CENTER | Age: 66
End: 2021-05-03
Attending: PHYSICIAN ASSISTANT
Payer: MEDICARE

## 2021-05-03 VITALS
DIASTOLIC BLOOD PRESSURE: 70 MMHG | RESPIRATION RATE: 16 BRPM | HEIGHT: 63 IN | WEIGHT: 194.22 LBS | SYSTOLIC BLOOD PRESSURE: 100 MMHG | HEART RATE: 85 BPM | BODY MASS INDEX: 34.41 KG/M2 | OXYGEN SATURATION: 94 % | TEMPERATURE: 97.2 F

## 2021-05-03 DIAGNOSIS — M47.816 LUMBAR SPONDYLOSIS: ICD-10-CM

## 2021-05-03 PROCEDURE — 700117 HCHG RX CONTRAST REV CODE 255: Performed by: PAIN MEDICINE

## 2021-05-03 PROCEDURE — 160002 HCHG RECOVERY MINUTES (STAT)

## 2021-05-03 PROCEDURE — 62304 MYELOGRAPHY LUMBAR INJECTION: CPT

## 2021-05-03 PROCEDURE — 72132 CT LUMBAR SPINE W/DYE: CPT | Mod: MH

## 2021-05-03 PROCEDURE — A9270 NON-COVERED ITEM OR SERVICE: HCPCS | Performed by: RADIOLOGY

## 2021-05-03 PROCEDURE — 700102 HCHG RX REV CODE 250 W/ 637 OVERRIDE(OP): Performed by: RADIOLOGY

## 2021-05-03 PROCEDURE — 62284 INJECTION FOR MYELOGRAM: CPT

## 2021-05-03 RX ORDER — HYDROCODONE BITARTRATE AND ACETAMINOPHEN 5; 325 MG/1; MG/1
2 TABLET ORAL EVERY 6 HOURS PRN
Status: DISCONTINUED | OUTPATIENT
Start: 2021-05-03 | End: 2021-05-03 | Stop reason: HOSPADM

## 2021-05-03 RX ADMIN — IOHEXOL 10 ML: 180 INJECTION INTRAVENOUS at 13:45

## 2021-05-03 RX ADMIN — HYDROCODONE BITARTRATE AND ACETAMINOPHEN 2 TABLET: 5; 325 TABLET ORAL at 14:22

## 2021-05-03 ASSESSMENT — FIBROSIS 4 INDEX: FIB4 SCORE: 1.7

## 2021-05-03 NOTE — DISCHARGE INSTRUCTIONS
ACTIVITY: Rest and take it easy for the first 24 hours.  A responsible adult is recommended to remain with you during that time.  It is normal to feel sleepy.  We encourage you to not do anything that requires balance, judgment or coordination.    MILD FLU-LIKE SYMPTOMS ARE NORMAL. YOU MAY EXPERIENCE GENERALIZED MUSCLE ACHES, THROAT IRRITATION, HEADACHE AND/OR SOME NAUSEA.    FOR 24 HOURS DO NOT:  Drive, operate machinery or run household appliances.  Drink beer or alcoholic beverages.   Make important decisions or sign legal documents.    SPECIAL INSTRUCTIONS: KEEP INCISION DRY FOR 24 HRS.  NO DRIVING FOR 24 HRS    DIET: To avoid nausea, slowly advance diet as tolerated, avoiding spicy or greasy foods for the first day.  Add more substantial food to your diet according to your physician's instructions.  Babies can be fed formula or breast milk as soon as they are hungry.  INCREASE FLUIDS AND FIBER TO AVOID CONSTIPATION.    SURGICAL DRESSING/BATHING: *MAY SHOWER TOMORROW AFTERNOON.  NO BATHTUB FOR 1 WEEK**    FOLLOW-UP APPOINTMENT:  A follow-up appointment should be arranged with your doctor in *[facility] [place]   [phone] **; call to schedule.    You should CALL YOUR PHYSICIAN if you develop:  Fever greater than 101 degrees F.  Pain not relieved by medication, or persistent nausea or vomiting.  Excessive bleeding (blood soaking through dressing) or unexpected drainage from the wound.  Extreme redness or swelling around the incision site, drainage of pus or foul smelling drainage.  Inability to urinate or empty your bladder within 8 hours.  Problems with breathing or chest pain.    You should call 911 if you develop problems with breathing or chest pain.  If you are unable to contact your doctor or surgical center, you should go to the nearest emergency room or urgent care center.  Physician's telephone #: *[phone]**    If any questions arise, call your doctor.  If your doctor is not available, please feel free to call  the Surgical Center at [phone]. The Contact Center is open Monday through Friday 7AM to 5PM and may speak to a nurse at [phone], or toll free at [phone].     A registered nurse may call you a few days after your surgery to see how you are doing after your procedure.    MEDICATIONS: Resume taking daily medication.  Take prescribed pain medication with food.  If no medication is prescribed, you may take non-aspirin pain medication if needed.  PAIN MEDICATION CAN BE VERY CONSTIPATING.  Take a stool softener or laxative such as senokot, pericolace, or milk of magnesia if needed.      If your physician has prescribed pain medication that includes Acetaminophen (Tylenol), do not take additional Acetaminophen (Tylenol) while taking the prescribed medication.    Depression / Suicide Risk    As you are discharged from this [facility] facility, it is important to learn how to keep safe from harming yourself.    Recognize the warning signs:  · Abrupt changes in personality, positive or negative- including increase in energy   · Giving away possessions  · Change in eating patterns- significant weight changes-  positive or negative  · Change in sleeping patterns- unable to sleep or sleeping all the time   · Unwillingness or inability to communicate  · Depression  · Unusual sadness, discouragement and loneliness  · Talk of wanting to die  · Neglect of personal appearance   · Rebelliousness- reckless behavior  · Withdrawal from people/activities they love  · Confusion- inability to concentrate     If you or a loved one observes any of these behaviors or has concerns about self-harm, here's what you can do:  · Talk about it- your feelings and reasons for harming yourself  · Remove any means that you might use to hurt yourself (examples: pills, rope, extension cords, firearm)  · Get professional help from the community (Mental Health, Substance Abuse, psychological counseling)  · Do not be alone:Call your Safe  Contact- someone whom you trust who will be there for you.  · Call your local CRISIS HOTLINE 776-9935 or 899-639-7039  · Call your local Children's Mobile Crisis Response Team [place] (725) 610-2051 or www.UsingMiles  · Call the toll free National Suicide Prevention Hotlines   · National Suicide Prevention Lifeline 350-772-MCOM (0728)  · National WAY Systems Line Network 800-SUICIDE (677-0140)

## 2021-05-03 NOTE — OR NURSING
1310   PT RECEIVED FROM IR,  S/P LUMBAR MYELOGRAM.  BAND-AID INTACT. PT IS TAKING PO FLUID WELL.  LYING SUPINE.  DENIES PAIN AT THIS TIME.    1345  PT STARTS TO C/O OF LEGS SCIATIC PAIN.  CALL TO DR ÁLVAREZ.  ORDER RECEIVED FOR MICHEL PER MAR'S.  OK TO DC HOME @ 3PM.    1430   VERBALIZED PAIN RELIEF.  SITS UP AT BEDSIDE.    1500  UP AND AMBULATE TO BATHROOM WITHOUT DIFFICULTY.   IS HERE.  DC INSTRUCTIONS GIVEN TO PT AND .  VERBALIZED UNDERSTANDING OF ALL.  NO HL.  PT DC TO HOME,  VIA HER OWN WALKER,  ACCOMPANIED BY ,  REFUSED STAFF ESCORT.

## (undated) DEVICE — TUBE E-T HI-LO CUFF 7.0MM (10EA/PK)

## (undated) DEVICE — SUTURE GENERAL

## (undated) DEVICE — GOWN SURGEONS X-LARGE - DISP. (30/CA)

## (undated) DEVICE — GLOVE BIOGEL SZ 7.5 SURGICAL PF LTX - (50PR/BX 4BX/CA)

## (undated) DEVICE — DRESSING NON ADHERENT 3 X 4 - STERILE (100/BX 12BX/CA)

## (undated) DEVICE — SPONGE GAUZESTER 4 X 4 4PLY - (128PK/CA)

## (undated) DEVICE — ELECTRODE DUAL RETURN W/ CORD - (50/PK)

## (undated) DEVICE — SODIUM CHL IRRIGATION 0.9% 1000ML (12EA/CA)

## (undated) DEVICE — KIT ROOM DECONTAMINATION

## (undated) DEVICE — DRAPE C-ARM LARGE 41IN X 74 IN - (10/BX 2BX/CA)

## (undated) DEVICE — KIT ANESTHESIA W/CIRCUIT & 3/LT BAG W/FILTER (20EA/CA)

## (undated) DEVICE — PACK MINOR BASIN - (2EA/CA)

## (undated) DEVICE — DRESSING TRANSPARENT FILM TEGADERM 4 X 4.75" (50EA/BX)"

## (undated) DEVICE — BINDER ABDOMINAL 45-62 INCH - 4 PANEL 12 IN (1/EA)

## (undated) DEVICE — GLOVE BIOGEL PI ULTRATOUCH SZ 7.5 SURGICAL PF LF -(50/BX 4BX/CA)

## (undated) DEVICE — DRAPE STRLE REG TOWEL 18X24 - (10/BX 4BX/CA)"

## (undated) DEVICE — SYRINGE LOSS OF RESIST. 50/BX - (50/CA)

## (undated) DEVICE — MASK ANESTHESIA ADULT  - (100/CA)

## (undated) DEVICE — Device

## (undated) DEVICE — NEPTUNE 4 PORT MANIFOLD - (20/PK)

## (undated) DEVICE — NEEDLE NON SAFETY HYPO 22 GA X 1 1/2 IN (100/BX)

## (undated) DEVICE — DRESSING TRANSPARENT FILM TEGADERM 2.375 X 2.75"  (100EA/BX)"

## (undated) DEVICE — DRAPE LAPAROTOMY T SHEET - (12EA/CA)

## (undated) DEVICE — NEEDLE SPINAL NON-SAFETY 25GA X 3 (25EA/BX)"

## (undated) DEVICE — TUBING CLEARLINK DUO-VENT - C-FLO (48EA/CA)

## (undated) DEVICE — BLADE SURGICAL #15 - (50/BX 3BX/CA)

## (undated) DEVICE — ARMREST CRADLE FOAM - (2PR/PK 12PR/CA)

## (undated) DEVICE — HEADREST PRONEVIEW LARGE - (10/CA)

## (undated) DEVICE — SYRINGE 10 ML CONTROL LL (25EA/BX 4BX/CA)

## (undated) DEVICE — HEAD HOLDER JUNIOR/ADULT

## (undated) DEVICE — CONTAINER, SPECIMEN, STERILE

## (undated) DEVICE — SUTURE 0 ETHIBOND CT-1 - (12/BX) 18 INCH

## (undated) DEVICE — GLOVE BIOGEL PI INDICATOR SZ 7.5 SURGICAL PF LF -(50/BX 4BX/CA)

## (undated) DEVICE — DRAPE IOBAN II INCISE 23X17 - (10EA/BX 4BX/CA)

## (undated) DEVICE — SUTURE 2-0 VICRYL PLUS CT-1 36 (36PK/BX)"

## (undated) DEVICE — SET EXTENSION WITH 2 PORTS (48EA/CA) ***PART #2C8610 IS A SUBSTITUTE*****

## (undated) DEVICE — SUTURE 4-0 MONOCRYL PLUS PS-1 - 27 INCH (36/BX)

## (undated) DEVICE — ELECTRODE 850 FOAM ADHESIVE - HYDROGEL RADIOTRNSPRNT (50/PK)

## (undated) DEVICE — SENSOR SPO2 NEO LNCS ADHESIVE (20/BX) SEE USER NOTES